# Patient Record
Sex: MALE | Race: BLACK OR AFRICAN AMERICAN | NOT HISPANIC OR LATINO | Employment: OTHER | URBAN - NONMETROPOLITAN AREA
[De-identification: names, ages, dates, MRNs, and addresses within clinical notes are randomized per-mention and may not be internally consistent; named-entity substitution may affect disease eponyms.]

---

## 2017-01-20 ENCOUNTER — HOSPITAL ENCOUNTER (INPATIENT)
Dept: OBSERVATION | Facility: HOSPITAL | Age: 57
LOS: 2 days | Discharge: HOME | End: 2017-01-22
Attending: INTERNAL MEDICINE | Admitting: INTERNAL MEDICINE

## 2017-01-20 LAB
ALBUMIN SERPL-MCNC: 3.8 GM/DL (ref 3.4–4.8)
ALP SERPL-CCNC: 55 U/L (ref 38–126)
ALT SERPL-CCNC: 35 U/L (ref 21–72)
ANION GAP SERPL CALCULATED.3IONS-SCNC: 13 MMOL/L (ref 5–15)
AST SERPL-CCNC: 37 U/L (ref 17–59)
BASOPHILS # BLD AUTO: 0.02 X1000/UL (ref 0–0.2)
BASOPHILS NFR BLD AUTO: 0.3 % (ref 0–2)
BILIRUB SERPL-MCNC: 1.3 MG/DL (ref 0.2–1.3)
BUN SERPL-MCNC: 14 MG/DL (ref 7–21)
CALCIUM SERPL-MCNC: 9.3 MG/DL (ref 8.4–10.2)
CHLORIDE SERPL-SCNC: 101 MMOL/L (ref 95–110)
CK MB SERPL-MCNC: 14.5 NG/ML (ref 0–5)
CK MB SERPL-MCNC: 8.7 NG/ML (ref 0–5)
CK MB SERPL-MCNC: 8.8 NG/ML (ref 0–5)
CK SERPL-CCNC: 196 U/L (ref 55–170)
CK SERPL-CCNC: 209 U/L (ref 55–170)
CK SERPL-CCNC: 256 U/L (ref 55–170)
CO2 SERPL-SCNC: 23 MMOL/L (ref 22–31)
CONV AUTO IG#: 0.01 X1000/UL (ref 0.01–0.02)
CONV AUTO IG%: 0.2 % (ref 0–0.5)
CREAT SERPL-MCNC: 1.3 MG/DL (ref 0.7–1.3)
EOSINOPHIL # BLD AUTO: 0.11 X1000/UL (ref 0–0.7)
EOSINOPHIL NFR BLD AUTO: 1.7 % (ref 0–7)
ERYTHROCYTE [DISTWIDTH] IN BLOOD: 12.9 % (ref 11.5–14.5)
GLUCOSE SERPL-MCNC: 188 MG/DL (ref 60–100)
GRANULOCYTES # BLD AUTO: 3.62 X1000/UL (ref 2–8.6)
GRANULOCYTES NFR BLD AUTO: 56.7 % (ref 37–80)
HBA1C MFR BLD CALC: 6.6 %TOTHGB (ref 4–5.6)
HCT VFR BLD CALC: 36.7 % (ref 39–49)
HGB BLD-MCNC: 13 GM/DL (ref 13.7–17.3)
LYMPHOCYTES # BLD AUTO: 2.13 X1000/UL (ref 0.6–4.2)
LYMPHOCYTES NFR BLD AUTO: 33.4 % (ref 10–50)
MCH RBC QN: 28.1 PG (ref 26–34)
MCHC RBC-ENTMCNC: 35.4 GM/DL (ref 31.5–36.3)
MCV RBC: 79.4 FL (ref 80–98)
MONOCYTES # BLD AUTO: 0.49 X1000/UL (ref 0–0.9)
MONOCYTES NFR BLD AUTO: 7.7 % (ref 0–12)
NRBC BLD AUTO-RTO: 0 % (ref 0–0.2)
NRBC SPEC MANUAL: 0 X1000/UL
PLATELET # BLD: 209 X1000/MM3 (ref 150–450)
PMV BLD: 9.4 FL (ref 8–12)
POTASSIUM SERPL-SCNC: 4.1 MMOL/L (ref 3.5–5.1)
PROT SERPL-MCNC: 6.9 GM/DL (ref 6.3–8.6)
RBC # BLD: 4.62 MEGA/MM3 (ref 4.37–5.74)
SODIUM SERPL-SCNC: 137 MMOL/L (ref 137–145)
TROPONIN I SERPL-MCNC: 0.15 NG/ML (ref 0–0.03)
TROPONIN I SERPL-MCNC: 0.16 NG/ML (ref 0–0.03)
TROPONIN I SERPL-MCNC: 0.16 NG/ML (ref 0–0.03)
WBC # BLD: 6.4 X1000/UL (ref 3.2–9.8)

## 2017-01-20 PROCEDURE — 82550 ASSAY OF CK (CPK): CPT

## 2017-01-20 PROCEDURE — 9990

## 2017-01-20 PROCEDURE — C1725 CATH, TRANSLUMIN NON-LASER: HCPCS

## 2017-01-20 PROCEDURE — C1769 GUIDE WIRE: HCPCS

## 2017-01-20 PROCEDURE — C1887 CATHETER, GUIDING: HCPCS

## 2017-01-20 PROCEDURE — C9600 PERC DRUG-EL COR STENT SING: HCPCS

## 2017-01-20 PROCEDURE — C1766 INTRO/SHEATH,STRBLE,NON-PEEL: HCPCS

## 2017-01-20 PROCEDURE — C1894 INTRO/SHEATH, NON-LASER: HCPCS

## 2017-01-20 PROCEDURE — 9990 CBC

## 2017-01-20 PROCEDURE — 36415 COLL VENOUS BLD VENIPUNCTURE: CPT

## 2017-01-20 PROCEDURE — 83036 HEMOGLOBIN GLYCOSYLATED A1C: CPT

## 2017-01-20 PROCEDURE — C1874 STENT, COATED/COV W/DEL SYS: HCPCS

## 2017-01-20 PROCEDURE — B205YZZ PLAIN RADIOGRAPHY OF LEFT HEART USING OTHER CONTRAST: ICD-10-PCS | Performed by: INTERNAL MEDICINE

## 2017-01-20 PROCEDURE — 80053 COMPREHEN METABOLIC PANEL: CPT

## 2017-01-20 PROCEDURE — 71010: CPT

## 2017-01-20 PROCEDURE — 93005 ELECTROCARDIOGRAM TRACING: CPT

## 2017-01-20 PROCEDURE — 93454 CORONARY ARTERY ANGIO S&I: CPT

## 2017-01-20 PROCEDURE — 85025 COMPLETE CBC W/AUTO DIFF WBC: CPT

## 2017-01-20 PROCEDURE — B201YZZ PLAIN RADIOGRAPHY OF MULTIPLE CORONARY ARTERIES USING OTHER CONTRAST: ICD-10-PCS | Performed by: INTERNAL MEDICINE

## 2017-01-20 PROCEDURE — 027034Z DILATION OF CORONARY ARTERY, ONE ARTERY WITH DRUG-ELUTING INTRALUMINAL DEVICE, PERCUTANEOUS APPROACH: ICD-10-PCS | Performed by: INTERNAL MEDICINE

## 2017-01-20 PROCEDURE — 82553 CREATINE MB FRACTION: CPT

## 2017-01-20 PROCEDURE — 84484 ASSAY OF TROPONIN QUANT: CPT

## 2017-01-20 PROCEDURE — 9990 COMPREHENSIVE METABOLIC PANEL

## 2017-01-20 PROCEDURE — 99285 EMERGENCY DEPT VISIT HI MDM: CPT

## 2017-01-20 PROCEDURE — 4A023N7 MEASUREMENT OF CARDIAC SAMPLING AND PRESSURE, LEFT HEART, PERCUTANEOUS APPROACH: ICD-10-PCS | Performed by: INTERNAL MEDICINE

## 2017-01-20 RX ORDER — ACETAMINOPHEN 325 MG/1
650 TABLET ORAL EVERY 4 HOURS PRN
Status: DISCONTINUED | OUTPATIENT
Start: 2017-01-21 | End: 2017-01-20 | Stop reason: SDUPTHER

## 2017-01-20 RX ORDER — HYDROCODONE BITARTRATE AND ACETAMINOPHEN 10; 325 MG/1; MG/1
1 TABLET ORAL 2 TIMES DAILY PRN
COMMUNITY

## 2017-01-20 RX ORDER — AMOXICILLIN 250 MG
1 CAPSULE ORAL DAILY PRN
Status: DISCONTINUED | OUTPATIENT
Start: 2017-01-21 | End: 2017-01-22 | Stop reason: HOSPADM

## 2017-01-20 RX ORDER — ASPIRIN 81 MG/1
81 TABLET, CHEWABLE ORAL DAILY
Status: DISCONTINUED | OUTPATIENT
Start: 2017-01-21 | End: 2017-01-22 | Stop reason: HOSPADM

## 2017-01-20 RX ORDER — HYDROCODONE BITARTRATE AND ACETAMINOPHEN 7.5; 325 MG/1; MG/1
1 TABLET ORAL EVERY 6 HOURS PRN
Status: DISCONTINUED | OUTPATIENT
Start: 2017-01-21 | End: 2017-01-22 | Stop reason: HOSPADM

## 2017-01-20 RX ORDER — NAPROXEN 500 MG/1
500 TABLET ORAL 2 TIMES DAILY WITH MEALS
COMMUNITY
End: 2017-01-22 | Stop reason: HOSPADM

## 2017-01-20 RX ORDER — TEMAZEPAM 15 MG/1
15 CAPSULE ORAL NIGHTLY PRN
Status: DISCONTINUED | OUTPATIENT
Start: 2017-01-21 | End: 2017-01-22 | Stop reason: HOSPADM

## 2017-01-20 RX ORDER — AMLODIPINE BESYLATE AND BENAZEPRIL HYDROCHLORIDE 2.5; 1 MG/1; MG/1
1 CAPSULE ORAL DAILY
COMMUNITY
End: 2017-01-22 | Stop reason: HOSPADM

## 2017-01-20 RX ORDER — HEPARIN SODIUM 10000 [USP'U]/100ML
250 INJECTION, SOLUTION INTRAVENOUS CONTINUOUS
Status: DISCONTINUED | OUTPATIENT
Start: 2017-01-21 | End: 2017-01-21 | Stop reason: ALTCHOICE

## 2017-01-20 RX ORDER — ONDANSETRON 2 MG/ML
4 INJECTION INTRAMUSCULAR; INTRAVENOUS EVERY 6 HOURS PRN
Status: DISCONTINUED | OUTPATIENT
Start: 2017-01-21 | End: 2017-01-22 | Stop reason: HOSPADM

## 2017-01-20 RX ORDER — ZOLPIDEM TARTRATE 10 MG/1
10 TABLET ORAL NIGHTLY PRN
COMMUNITY

## 2017-01-20 RX ORDER — ATORVASTATIN CALCIUM 40 MG/1
40 TABLET, FILM COATED ORAL NIGHTLY
Status: DISCONTINUED | OUTPATIENT
Start: 2017-01-21 | End: 2017-01-22 | Stop reason: HOSPADM

## 2017-01-20 NOTE — IP AVS SNAPSHOT
AFTER VISIT SUMMARY             Eitan Richards           About your hospitalization     You were admitted on:  January 20, 2017 You last received care in the:  [facility] UNIT       Procedures & Surgeries         Medications    If you or your caregiver advised us that you are currently taking a medication and that medication is marked below as “Resume”, this simply indicates that we have reviewed those medications to make sure our new therapy recommendations do not interfere.  If you have concerns about medications other than those new ones which we are prescribing today, please consult the physician who prescribed them (or your primary physician).  Our review of your home medications is not meant to indicate that we are directing their use.             Your Medications      START taking these medications     aspirin 81 MG chewable tablet   Chew 1 tablet Daily.   Last time this was given:  1/22/2017  8:01 AM   Next Dose Due:  1/23/2017 9:00 am             atorvastatin 40 MG tablet   Take 1 tablet by mouth Every Night.   Last time this was given:  1/21/2017  9:05 PM   Commonly known as:  LIPITOR   Next Dose Due:  1/23/2017 9:00 pm           lisinopril 2.5 MG tablet   Take 1 tablet by mouth Daily.   Commonly known as:  ZESTRIL   Next Dose Due:  1/23/2017 9:00 am           metoprolol tartrate 25 MG tablet   Take 1 tablet by mouth Every 12 (Twelve) Hours.   Last time this was given:  1/22/2017  8:01 AM   Commonly known as:  LOPRESSOR   Next Dose Due:  1/23/2017 9:00 am           ticagrelor 90 MG tablet tablet   Take 1 tablet by mouth 2 (Two) Times a Day.   Last time this was given:  1/22/2017  5:53 PM   Commonly known as:  BRILINTA   Next Dose Due:  1/22/2017 9:00 pm             CHANGE how you take these medications     metFORMIN 500 MG tablet   Take 1 tablet by mouth 2 (Two) Times a Day With Meals.   Commonly known as:  GLUCOPHAGE   What changed:    - how much to take  - when to take this      Next Dose Due:  1/23/2017 5:00 pm   Notes to Patient:  Do not take until tomorrow afternoon             CONTINUE taking these medications     AMBIEN 10 MG tablet   Take 10 mg by mouth At Night As Needed for sleep.   Last time this was given:  1/21/2017  1:55 AM   Generic drug:  zolpidem   Next Dose Due:  1/23/2017 9:00 pm           HYDROcodone-acetaminophen  MG per tablet   Take 1 tablet by mouth 2 (Two) Times a Day As Needed for moderate pain (4-6).   Commonly known as:  NORCO   Next Dose Due:  Every 12 hours as needed for pain           sertraline 50 MG tablet   Take 50 mg by mouth Daily.   Last time this was given:  1/22/2017  8:01 AM   Commonly known as:  ZOLOFT   Next Dose Due:  1/23/17 9:00 am             STOP taking these medications     amLODIPine-benazepril 2.5-10 MG per capsule   Commonly known as:  LOTREL 2.5-10           naproxen 500 MG tablet   Commonly known as:  NAPROSYN                Where to Get Your Medications      You can get these medications from any pharmacy     Bring a paper prescription for each of these medications     aspirin 81 MG chewable tablet    atorvastatin 40 MG tablet    lisinopril 2.5 MG tablet    metFORMIN 500 MG tablet    metoprolol tartrate 25 MG tablet    ticagrelor 90 MG tablet tablet                  Your Medications      Your Medication List           Morning Noon Evening Bedtime As Needed    AMBIEN 10 MG tablet   Take 10 mg by mouth At Night As Needed for sleep.   Generic drug:  zolpidem                                   aspirin 81 MG chewable tablet   Chew 1 tablet Daily.                                   atorvastatin 40 MG tablet   Take 1 tablet by mouth Every Night.   Commonly known as:  LIPITOR                                   HYDROcodone-acetaminophen  MG per tablet   Take 1 tablet by mouth 2 (Two) Times a Day As Needed for moderate pain (4-6).   Commonly known as:  NORCO                                   lisinopril 2.5 MG tablet   Take 1 tablet by  mouth Daily.   Commonly known as:  ZESTRIL                                   metFORMIN 500 MG tablet   Take 1 tablet by mouth 2 (Two) Times a Day With Meals.   Commonly known as:  GLUCOPHAGE   Notes to Patient:  Do not take until tomorrow afternoon                                      metoprolol tartrate 25 MG tablet   Take 1 tablet by mouth Every 12 (Twelve) Hours.   Commonly known as:  LOPRESSOR                                      sertraline 50 MG tablet   Take 50 mg by mouth Daily.   Commonly known as:  ZOLOFT                                   ticagrelor 90 MG tablet tablet   Take 1 tablet by mouth 2 (Two) Times a Day.   Commonly known as:  BRILINTA                                            Instructions for After Discharge        Activity Instructions     As tolerated. Please refer to Post Cardiac Catheterization            Diet Instructions     Cardiac Diabetic Diet- information given             Discharge Instructions       The Diet- Heart healthy,  diabetic diet.  Activity- as tolerated  Medications- see the medication reconciliation completed the time of discharge.  Patient was asked to call or return if the temperatures greater than 100.4, if there is worsening nausea, vomiting, chest pain, shortness of breath, abdominal pain or any other concerns.    Discharge References/Attachments     ACUTE CORONARY SYNDROME (ENGLISH)    CORONARY ARTERY DISEASE, MALE (ENGLISH)    HEART ATTACK, NON-ST SEGMENT ELEVATION (ENGLISH)    TRIGLYCERIDES (TG, TRIG) (ENGLISH)    TYPE 2 DIABETES MELLITUS, ADULT (ENGLISH)       Follow-ups for After Discharge        Follow-up Information     Follow up with Ajith Lutz MD. Schedule an appointment as soon as possible for a visit in 3 week(s).    Specialty:  Cardiology    Why:  For NSTEMI    Contact information:    [address], [address]  [facility] [address] [phone]          Follow up with Clementine Zelaya MD. Schedule an appointment as soon as possible for a visit  in 1 week(s).    Specialty:  Nephrology    Why:  For CKD    Contact information:    [address] [facility] [address] [phone]          Follow up with Dr. Baeza. Schedule an appointment as soon as possible for a visit in 1 week(s).    Why:  Please call MD for follow up appointment    Contact information:    [facility] [place]   [phone]      [employer] Signup     Our records indicate that you have an active [employer] account.    You can view your After Visit Summary by going to [employer] and logging in with your [employer] username and password.  If you don't have a [employer] username and password but a parent or guardian has access to your record, the parent or guardian should login with their own [employer] username and password and access your record to view the After Visit Summary.    If you have questions, you can email [email or web address] or call [phone] to talk to our [employer] staff.  Remember, [employer] is NOT to be used for urgent needs.  For medical emergencies, dial 911.           Summary of Your Hospitalization        Reason for Hospitalization     Your primary diagnosis was:  Not on File    Your diagnoses also included:  Non Q Wave Myocardial Infarction, High Blood Pressure, Diabetes      Care Providers     Provider Service Role Specialty    John Omalley MD Medicine Attending Provider Hospitalist    Stephen Corey MD Medicine Consulting Physician  Hospitalist     Ajith Lutz MD Cardiology Consulting Physician  Cardiology       Your Allergies  Date Reviewed: 1/20/2017    No active allergies      Pending Labs     Order Current Status    POC Glucose Fingerstick In process      Patient Belongings Returned     Document Return of Belongings Flowsheet     Were the patient bedside belongings sent home?   Yes   Belongings Retrieved from Security & Sent Home   N/A    Belongings Sent to Safe   --   Medications Retrieved from Pharmacy  & Sent Home   N/A              More Information      Acute Coronary Syndrome  Acute coronary syndrome (ACS) is a serious problem in which there is suddenly not enough blood and oxygen supplied to the heart. ACS may mean that one or more of the blood vessels in your heart (coronary arteries) may be blocked. ACS can result in chest pain or a heart attack (myocardial infarction or MI).  CAUSES  This condition is caused by atherosclerosis, which is the buildup of fat and cholesterol (plaque) on the inside of the arteries. Over time, the plaque may narrow or block the artery, and this will lessen blood flow to the heart. Plaque can also become weak and break off within a coronary artery to form a clot and cause a sudden blockage.  RISK FACTORS  The risks factors of this condition include:  · High cholesterol levels.  · High blood pressure (hypertension).  · Smoking.  · Diabetes.  · Age.  · Family history of chest pain, heart disease, or stroke.  · Lack of exercise.  SYMPTOMS  The most common signs of this condition include:  · Chest pain, which can be:    A crushing or squeezing in the chest.    A tightness, pressure, fullness, or heaviness in the chest.    Present for more than a few minutes, or it can stop and recur.  · Pain in the arms, neck, jaw, or back.  · Unexplained heartburn or indigestion.  · Shortness of breath.  · Nausea.  · Sudden cold sweats.  · Feeling light-headed or dizzy.  Sometimes, this condition has no symptoms.  DIAGNOSIS  ACS may be diagnosed through the following tests:  · Electrocardiogram (ECG).  · Blood tests.  · Coronary angiogram. This is a procedure to look at the coronary arteries to see if there is any blockage.  TREATMENT  Treatment for ACS may include:  · Healthy behavioral changes to reduce or control risk factors.  · Medicine.  · Coronary stenting. A stent helps to keep an artery open.  · Coronary angioplasty. This procedure widens a narrowed or blocked artery.  · Coronary artery  bypass surgery. This will allow your blood to pass the blockage (bypass) to reach your heart.  HOME CARE INSTRUCTIONS  Eating and Drinking  · Follow a heart-healthy diet. A dietitian can you help to educate you about healthy food options and changes.  · Use healthy cooking methods such as roasting, grilling, broiling, baking, poaching, steaming, or stir-frying. Talk to a dietitian to learn more about healthy cooking methods.  Medicines  · Take medicines only as directed by your health care provider.  · Do not take the following medicines unless your health care provider approves:    Nonsteroidal anti-inflammatory drugs (NSAIDs), such as ibuprofen, naproxen, or celecoxib.    Vitamin supplements that contain vitamin A, vitamin E, or both.    Hormone replacement therapy that contains estrogen with or without progestin.  · Stop illegal drug use.  Activities  · Follow an exercise program that is approved by your health care provider.  · Plan rest periods when you are fatigued.  Lifestyle  · Do not use any tobacco products, including cigarettes, chewing tobacco, or electronic cigarettes. If you need help quitting, ask your health care provider.  · If you drink alcohol, and your health care provider approves, limit your alcohol intake to no more than 1 drink per day. One drink equals 12 ounces of beer, 5 ounces of wine, or 1½ ounces of hard liquor.  · Learn to manage stress.  · Maintain a healthy weight. Lose weight as approved by your health care provider.  General Instructions  · Manage other health conditions, such as hypertension and diabetes, as directed by your health care provider.  · Keep all follow-up visits as directed by your health care provider. This is important.  · Your health care provider may ask you to monitor your blood pressure. A blood pressure reading consists of a higher number over a lower number, such as 110 over 72, written as 110/72. Ideally, your blood pressure should be:    Below 140/90 if you  have no other medical conditions.    Below 130/80 if you have diabetes or kidney disease.  SEEK IMMEDIATE MEDICAL CARE IF:  · You have pain in your chest, neck, arm, jaw, stomach, or back that lasts more than a few minutes, is recurring, or is not relieved by taking medicine under your tongue (sublingual nitroglycerin).  · You have profuse sweating without cause.  · You have unexplained:    Heartburn or indigestion.    Shortness of breath or difficulty breathing.    Nausea or vomiting.    Fatigue.    Feelings of nervousness or anxiety.    Weakness.    Diarrhea.  · You have sudden light-headedness or dizziness.  · You faint.  These symptoms may represent a serious problem that is an emergency. Do not wait to see if the symptoms will go away. Get medical help right away. Call your local emergency services (911 in the [place] Do not drive yourself to the clinic or hospital.     This information is not intended to replace advice given to you by your health care provider. Make sure you discuss any questions you have with your health care provider.     Document Released: 12/18/2006 Document Revised: 01/08/2016 Document Reviewed: 04/21/2015  [employer] Interactive Patient Education ©2016 [employer]          Coronary Artery Disease, Male  Coronary artery disease (CAD) is a process in which the blood vessels of the heart (coronary arteries) become narrow or blocked. The narrowing or blockage can lead to decreased blood flow to the heart muscle (angina). Prolonged reduced blood flow can cause a heart attack (myocardial infarction, MI). Because CAD is the leading cause of death in men, it is important to understand what causes this condition and how it is treated.  CAUSES  Atherosclerosis is the cause of CAD. This is the buildup of fat and cholesterol (plaque) on the inside of the arteries. Over time, the plaque may narrow or block the artery, and this will lessen blood flow to the heart. Plaque can also become weak and break  off within a coronary artery to form a clot and cause a sudden blockage.  RISK FACTORS  Many risk factors increase your chances of getting CAD, including:  · High cholesterol levels.  · High blood pressure (hypertension).  · Tobacco use.  · Diabetes.  · Age. Men over age 45 are at a greater risk of CAD.  · Gender. Men often develop CAD earlier in life than women.  · Family history of CAD.  · Obesity.  · Lack of exercise.  · A diet high in saturated fats.  SYMPTOMS   Many people do not experience any symptoms during the early stages of CAD. As the condition progresses, symptoms may include:   · Chest pain.  ¨ The pain can be described as a crushing or squeezing in the chest, or a tightness, pressure, fullness, or heaviness in the chest.  ¨ The pain can last more than a few minutes or can stop and recur.   · Pain in the arms, neck, jaw, or back.  · Unexplained heartburn or indigestion.  · Shortness of breath.  · Nausea.  · Sudden cold sweats.   Less common symptoms of CAD in men can include:  · Fatigue.  · Unexplained feelings of nervousness or anxiety.  · Weakness.  · Diarrhea.  · Sudden light-headedness.  DIAGNOSIS   Tests to diagnose CAD may include:  · ECG (electrocardiogram).  · Exercise stress test. This looks for signs of blockage when the heart is being exercised.  · Pharmacologic stress test. This test looks for signs of blockage when the heart is being stressed with a medicine.  · Blood tests.  · Coronary angiogram. This is a procedure to look at the coronary arteries to see if there is any blockage.  TREATMENT  The treatment of CAD may include the following:  · Healthy behavioral changes to reduce or control risk factors.  · Medicine.  · Coronary stenting. A stent helps to keep an artery open.  · Coronary angioplasty. This procedure widens a narrowed or blocked artery.  · Coronary artery bypass surgery. This will allow your blood to pass the blockage (bypass) to reach your heart.  HOME CARE  INSTRUCTIONS  · Take medicines only as directed by your health care provider.  · Do not take the following medicines unless your health care provider approves:    Nonsteroidal anti-inflammatory drugs (NSAIDs), such as ibuprofen, naproxen, or celecoxib.    Vitamin supplements that contain vitamin A, vitamin E, or both.  · Manage other health conditions such as hypertension and diabetes as directed by your health care provider.  · Follow a heart-healthy diet. A dietitian can help to educate you about healthy food options and changes.  · Use healthy cooking methods such as roasting, grilling, broiling, baking, poaching, steaming, or stir-frying. Talk to a dietitian to learn more about healthy cooking methods.  · Follow an exercise program approved by your health care provider.  · Maintain a healthy weight. Lose weight as approved by your health care provider.  · Plan rest periods when fatigued.  · Learn to manage stress.  · Do not use any tobacco products, including cigarettes, chewing tobacco, or electronic cigarettes. If you need help quitting, ask your health care provider.  · If you drink alcohol, and your health care provider approves, limit your alcohol intake to no more than 1 drink per day. One drink equals 12 ounces of beer, 5 ounces of wine, or 1½ ounces of hard liquor.  · Stop illegal drug use.  · Your health care provider may ask you to monitor your blood pressure. A blood pressure reading consists of a higher number over a lower number, such as 110 over 72, which is written as 110/72. Ideally, your blood pressure should be:    Below 140/90 if you have no other medical conditions.    Below 130/80 if you have diabetes or kidney disease.  · Keep all follow-up visits as directed by your health care provider. This is important.  SEEK IMMEDIATE MEDICAL CARE IF:  · You have pain in your chest, neck, arm, jaw, stomach, or back that lasts more than a few minutes, is recurring, or is unrelieved by taking medicine  under your tongue (sublingual nitroglycerin).  · You have profuse sweating without cause.  · You have unexplained:    Heartburn or indigestion.    Shortness of breath or difficulty breathing.    Nausea or vomiting.    Fatigue.    Feelings of nervousness or anxiety.    Weakness.    Diarrhea.  · You have sudden light-headedness or dizziness.  · You faint.  These symptoms may represent a serious problem that is an emergency. Do not wait to see if the symptoms will go away. Get medical help right away. Call your local emergency services (911 in the [place] Do not drive yourself to the hospital.     This information is not intended to replace advice given to you by your health care provider. Make sure you discuss any questions you have with your health care provider.     Document Released: 07/15/2015 Document Reviewed: 07/15/2015  [employer] Interactive Patient Education ©2016 [employer]          Non-ST Segment Elevation Heart Attack  A heart attack (myocardial infarction) happens when some of the heart muscle is injured or dies because it does not get enough oxygen. A non-ST segment elevation heart attack is a type of heart attack. It happens when the body does not get enough oxygen because an artery carrying blood to the heart muscles (coronary artery) becomes partly or temporarily blocked. This type of heart attack is usually less severe than the type of heart attack in which a coronary artery becomes completely blocked.  CAUSES  The most common cause of this condition is a blocked coronary artery. A coronary artery can become blocked from a gradual buildup of cholesterol, fat, and plaque. A blood clot can form over the plaque and block blood flow.  RISK FACTORS  This condition is more likely to develop in:  · Smokers.  · Males.  · Older adults.  · Overweight and obese adults.  · People with high blood pressure (hypertension), high cholesterol, or diabetes.  · People with a family history of heart disease.  · People  who do not get enough exercise.  · People who are under a lot of stress.  · People who drink too much alcohol.  · People who use illegal street drugs that increase the heart rate, such as cocaine and methamphetamines.  SYMPTOMS  Symptoms of this condition include:  · Chest pain or a feeling of pressure in the chest. It may feel like something is crushing or squeezing the chest.  · Discomfort in the upper back or in the area between the shoulder blades.  · Upper back pain.  · Tingling in the hands and arms.  · Shortness of breath.  · Heartburn or indigestion.  · Sudden cold sweats.  · Unexplained sweating.  · Sudden lightheadedness.  · Unexplained feelings of nervousness or anxiety.  · Feeling of tiredness, or not feeling well.  DIAGNOSIS  This condition is diagnosed based on a person's signs and symptoms and a physical exam. You may also have tests done, including:  · Blood tests.  · A chest X-ray.  · An test to measure the electrical activity of the heart (electrocardiogram).  · A test that uses sound waves to produce a picture of the heart (echocardiogram).  · A test to look at the heart arteries (coronary angiogram).  If you are still having chest pain after 12-24 hours, or if your health care providers think your heart is at risk, you may have a procedure called cardiac catheterization. In this procedure, a long, thin tube is inserted into an artery in your groin and moved up to the arteries in your heart. This procedure helps your health care provider figure out the source of the problem.   TREATMENT  This condition may be treated with:  · Bed rest in the hospital.  · Medicines to relieve chest pain.  · Medicines to protect the heart.  · If you have a blockage, a procedure in which the artery is opened (angioplasty) and a stent is placed to keep the artery open.  After initial treatment you may need to take medicine to:  · Keep your blood from clotting too easily.  · Control your blood pressure.  · Lower your  cholesterol.  · Control abnormal heart rhythms (arrhythmias).  HOME CARE INSTRUCTIONS  · Take medicines only as directed by your health care provider.  · Do not take the following medicines unless your health care provider approves:    Nonsteroidal anti-inflammatory drugs (NSAIDs), such as ibuprofen, naproxen, or celecoxib.    Vitamin supplements that contain vitamin A, vitamin E, or both.    Hormone replacement therapy that contains estrogen with or without progestin.  · Make lifestyle changes as directed by your health care provider. These may include:    Using no tobacco products, including cigarettes, chewing tobacco, and electronic cigarettes. If you are struggling to quit, ask your health care provider for help.    Exercising as directed by your health care provider. Ask for a list of activities that are safe for you.    Eating a heart-healthy diet. Work with a registered dietitian to learn healthy eating options.    Maintaining a healthy weight.    Managing other medical conditions, like diabetes.    Reducing stress.    Limiting how much alcohol you drink as directed by your health care provider.  SEEK IMMEDIATE MEDICAL CARE IF:  · You have any symptoms of this condition.     This information is not intended to replace advice given to you by your health care provider. Make sure you discuss any questions you have with your health care provider.     Document Released: 07/17/2006 Document Revised: 03/11/2013 Document Reviewed: 11/25/2015  [employer] Interactive Patient Education ©2016 [employer]          Triglycerides Test  Triglycerides are a type of fat in your body. Your body naturally produces some triglycerides. More can accumulate when you eat more food than your body needs.  Most triglycerides are stored in fatty tissues. They are released when the body needs energy. Triglycerides also circulate in your blood as part of a cholesterol particle known as very-low-density lipoprotein (VLDL). Cholesterol and  triglycerides are often tested at the same time. That test is called a lipid profile.  You may have this test as part of a routine physical exam. Health care providers recommend that adults have this test at least once every 5 years. High triglycerides can increase your risk for heart disease. You may need to have your triglycerides checked more often if you have other risk factors for heart disease.  This test requires a sample of blood taken from a vein in your hand or arm. Some lipid profiles can be done on a portable testing device using only a drop of blood taken from your fingertip (finger stick).  PREPARATION FOR TEST  · Do not eat anything for 9-12 hours before the test as directed by your health care provider.  · Do not consume alcohol for at least 24 hours before the test.  · Ask your health care provider if you should stop taking your regular medicines. Many medicines and supplements can interfere with the results of the test.  · Follow any other instructions given by your health care provider.  RESULTS  It is your responsibility to obtain your test results. Ask the lab or department performing the test when and how you will get your results. Contact your health care provider to discuss any questions you have about your results.   Triglycerides are usually measured in milligrams per deciliter (mg/dL).  Range of Normal Values  Ranges for normal values may vary among different labs and hospitals. You should always check with your health care provider after having lab work or other tests done to discuss whether your values are considered within normal limits.  The normal ranges for triglycerides are as follows:  · Adults:    Male:  mg/dL or 0.45-1.81 mmol/L (SI units).    Female:  mg/dL or 0.40-1.52 mmol/L (SI units).  · Children age 5 years or younger:    Male: 30-86 mg/dL.    Female: 32-99 mg/dL.  · Children age 6-11 years:    Male:  mg/dL.    Female:  mg/dL.  · Children age 12-15  years:    Male:  mg/dL.    Female:  mg/dL.  · Children age 16-19 years:    Male:  mg/dL.    Female:  mg/dL.  Meaning of Results Outside Normal Value Ranges  If your triglyceride level is higher than the normal range, you have an increased risk for heart disease. Triglycerides can also be elevated in certain diseases, such as diabetes.  If you have high triglycerides, your health care provider will likely recommend measures to reduce your triglycerides and your risk of heart disease. This can be done through:  · Diet.  · Exercise.  · Medicines.  · Lifestyle measures such as quitting smoking and limiting how much alcohol you drink.     This information is not intended to replace advice given to you by your health care provider. Make sure you discuss any questions you have with your health care provider.     Document Released: 01/20/2006 Document Revised: 01/08/2016 Document Reviewed: 04/09/2015  [employer] Interactive Patient Education ©2016 [employer]          Type 2 Diabetes Mellitus, Adult  Type 2 diabetes mellitus, often simply referred to as type 2 diabetes, is a long-lasting (chronic) disease. In type 2 diabetes, the pancreas does not make enough insulin (a hormone), the cells are less responsive to the insulin that is made (insulin resistance), or both. Normally, insulin moves sugars from food into the tissue cells. The tissue cells use the sugars for energy. The lack of insulin or the lack of normal response to insulin causes excess sugars to build up in the blood instead of going into the tissue cells. As a result, high blood sugar (hyperglycemia) develops. The effect of high sugar (glucose) levels can cause many complications.   Type 2 diabetes was also previously called adult-onset diabetes, but it can occur at any age.     RISK FACTORS   A person is predisposed to developing type 2 diabetes if someone in the family has the disease and also has one or more of the following primary  risk factors:  · Weight gain, or being overweight or obese.  · An inactive lifestyle.  · A history of consistently eating high-calorie foods.  Maintaining a normal weight and regular physical activity can reduce the chance of developing type 2 diabetes.  SYMPTOMS   A person with type 2 diabetes may not show symptoms initially. The symptoms of type 2 diabetes appear slowly. The symptoms include:  · Increased thirst (polydipsia).  · Increased urination (polyuria).  · Increased urination during the night (nocturia).  · Sudden or unexplained weight changes.  · Frequent, recurring infections.  · Tiredness (fatigue).  · Weakness.  · Vision changes, such as blurred vision.  · Fruity smell to your breath.  · Abdominal pain.  · Nausea or vomiting.  · Cuts or bruises which are slow to heal.  · Tingling or numbness in the hands or feet.  · An open skin wound (ulcer).  DIAGNOSIS  Type 2 diabetes is frequently not diagnosed until complications of diabetes are present. Type 2 diabetes is diagnosed when symptoms or complications are present and when blood glucose levels are increased. Your blood glucose level may be checked by one or more of the following blood tests:  · A fasting blood glucose test. You will not be allowed to eat for at least 8 hours before a blood sample is taken.  · A random blood glucose test. Your blood glucose is checked at any time of the day regardless of when you ate.  · A hemoglobin A1c blood glucose test. A hemoglobin A1c test provides information about blood glucose control over the previous 3 months.  · An oral glucose tolerance test (OGTT). Your blood glucose is measured after you have not eaten (fasted) for 2 hours and then after you drink a glucose-containing beverage.  TREATMENT   · You may need to take insulin or diabetes medicine daily to keep blood glucose levels in the desired range.  · If you use insulin, you may need to adjust the dosage depending on the carbohydrates that you eat with each  meal or snack.  · Lifestyle changes are recommended as part of your treatment. These may include:    Following an individualized diet plan developed by a nutritionist or dietitian.    Exercising daily.  Your health care providers will set individualized treatment goals for you based on your age, your medicines, how long you have had diabetes, and any other medical conditions you have. Generally, the goal of treatment is to maintain the following blood glucose levels:  · Before meals (preprandial):  mg/dL.  · After meals (postprandial): below 180 mg/dL.  · A1c: less than 6.5-7%.  HOME CARE INSTRUCTIONS   · Have your hemoglobin A1c level checked twice a year.  · Perform daily blood glucose monitoring as directed by your health care provider.  · Monitor urine ketones when you are ill and as directed by your health care provider.  · Take your diabetes medicine or insulin as directed by your health care provider to maintain your blood glucose levels in the desired range.    Never run out of diabetes medicine or insulin. It is needed every day.    If you are using insulin, you may need to adjust the amount of insulin given based on your intake of carbohydrates. Carbohydrates can raise blood glucose levels but need to be included in your diet. Carbohydrates provide vitamins, minerals, and fiber which are an essential part of a healthy diet. Carbohydrates are found in fruits, vegetables, whole grains, dairy products, legumes, and foods containing added sugars.  · Eat healthy foods. You should make an appointment to see a registered dietitian to help you create an eating plan that is right for you.  · Lose weight if you are overweight.  · Carry a medical alert card or wear your medical alert jewelry.  · Carry a 15-gram carbohydrate snack with you at all times to treat low blood glucose (hypoglycemia). Some examples of 15-gram carbohydrate snacks include:    Glucose tablets, 3 or 4.    Glucose gel, 15-gram tube.     Raisins, 2 tablespoons (24 grams).    Jelly beans, 6.    Animal crackers, 8.    Regular pop, 4 ounces (120 mL).    Gummy treats, 9.  · Recognize hypoglycemia. Hypoglycemia occurs with blood glucose levels of 70 mg/dL and below. The risk for hypoglycemia increases when fasting or skipping meals, during or after intense exercise, and during sleep. Hypoglycemia symptoms can include:    Tremors or shakes.    Decreased ability to concentrate.    Sweating.    Increased heart rate.    Headache.    Dry mouth.    Hunger.    Irritability.    Anxiety.    Restless sleep.    Altered speech or coordination.    Confusion.  · Treat hypoglycemia promptly. If you are alert and able to safely swallow, follow the 15:15 rule:    Take 15-20 grams of rapid-acting glucose or carbohydrate. Rapid-acting options include glucose gel, glucose tablets, or 4 ounces (120 mL) of fruit juice, regular soda, or low-fat milk.    Check your blood glucose level 15 minutes after taking the glucose.    Take 15-20 grams more of glucose if the repeat blood glucose level is still 70 mg/dL or below.    Eat a meal or snack within 1 hour once blood glucose levels return to normal.  · Be alert to feeling very thirsty and urinating more frequently than usual, which are early signs of hyperglycemia. An early awareness of hyperglycemia allows for prompt treatment. Treat hyperglycemia as directed by your health care provider.  · Engage in at least 150 minutes of moderate-intensity physical activity a week, spread over at least 3 days of the week or as directed by your health care provider. In addition, you should engage in resistance exercise at least 2 times a week or as directed by your health care provider. Try to spend no more than 90 minutes at one time inactive.  · Adjust your medicine and food intake as needed if you start a new exercise or sport.  · Follow your sick-day plan anytime you are unable to eat or drink as usual.  · Do not use any tobacco products  including cigarettes, chewing tobacco, or electronic cigarettes. If you need help quitting, ask your health care provider.  · Limit alcohol intake to no more than 1 drink per day for nonpregnant women and 2 drinks per day for men. You should drink alcohol only when you are also eating food. Talk with your health care provider whether alcohol is safe for you. Tell your health care provider if you drink alcohol several times a week.  · Keep all follow-up visits as directed by your health care provider. This is important.  · Schedule an eye exam soon after the diagnosis of type 2 diabetes and then annually.  · Perform daily skin and foot care. Examine your skin and feet daily for cuts, bruises, redness, nail problems, bleeding, blisters, or sores. A foot exam by a health care provider should be done annually.  · Brush your teeth and gums at least twice a day and floss at least once a day. Follow up with your dentist regularly.  · Share your diabetes management plan with your workplace or school.  · Keep your immunizations up to date. It is recommended that you receive a flu (influenza) vaccine every year. It is also recommended that you receive a pneumonia (pneumococcal) vaccine. If you are 65 years of age or older and have never received a pneumonia vaccine, this vaccine may be given as a series of two separate shots. Ask your health care provider which additional vaccines may be recommended.  · Learn to manage stress.  · Obtain ongoing diabetes education and support as needed.  · Participate in or seek rehabilitation as needed to maintain or improve independence and quality of life. Request a physical or occupational therapy referral if you are having foot or hand numbness, or difficulties with grooming, dressing, eating, or physical activity.  SEEK MEDICAL CARE IF:   · You are unable to eat food or drink fluids for more than 6 hours.  · You have nausea and vomiting for more than 6 hours.  · Your blood glucose level  is over 240 mg/dL.  · There is a change in mental status.  · You develop an additional serious illness.  · You have diarrhea for more than 6 hours.  · You have been sick or have had a fever for a couple of days and are not getting better.  · You have pain during any physical activity.    SEEK IMMEDIATE MEDICAL CARE IF:  · You have difficulty breathing.  · You have moderate to large ketone levels.     This information is not intended to replace advice given to you by your health care provider. Make sure you discuss any questions you have with your health care provider.     Document Released: 12/18/2006 Document Revised: 09/07/2016 Document Reviewed: 07/16/2013  [employer] Interactive Patient Education ©2016 [employer]            SYMPTOMS OF A STROKE    Call 911 or have someone take you to the Emergency Department if you have any of the following:    · Sudden numbness or weakness of your face, arm or leg especially on one side of the body  · Sudden confusion, diffiiculty speaking or trouble understanding   · Changes in your vision or loss of sight in one eye  · Sudden severe headache with no known cause  · sudden dizziness, trouble walking, loss of balance or coordination    It is important to seek emergency care right away if you have further stroke symptoms. If you get emergency help quickly, the powerful clot-dissolving medicines can reduce the disabilities caused by a stroke.     For more information:    American Stroke Association  9-208-7-STROKE  www.strokeassociation.org           IF YOU SMOKE OR USE TOBACCO PLEASE READ THE FOLLOWING:    Why is smoking bad for me?  Smoking increases the risk of heart disease, lung disease, vascular disease, stroke, and cancer.     If you smoke, STOP!    If you would like more information on quitting smoking, please visit the [employer] website: [email or web address]   This link will provide additional resources including the QUIT  line and the [employer] support groups.     For more information:    American Cancer Society  ([phone]    American Heart Association  1-909.748.4942               YOU ARE THE MOST IMPORTANT FACTOR IN YOUR RECOVERY.     Follow all instructions carefully.     I have reviewed my discharge instructions with my nurse, including the following information, if applicable:     Information about my illness and diagnosis   Follow up appointments (including lab draws)   Wound Care   Equipment Needs   Medications (new and continuing) along with side effects   Preventative information such as vaccines and smoking cessations   Diet   Pain   I know when to contact my Doctor's office or seek emergency care      I want my nurse to describe the side effects of my medications: YES NO   If the answer is no, I understand the side effects of my medications: YES NO   My nurse described the side effects of my medications in a way that I could understand: YES NO   I have taken my personal belongings and my own medications with me at discharge: YES NO            I have received this information and my questions have been answered. I have discussed any concerns I see with this plan with the nurse or physician. I understand these instructions.    Signature of Patient or Responsible Person: _____________________________________    Date: _________________  Time: __________________    Signature of Healthcare Provider: _______________________________________  Date: _________________  Time: __________________

## 2017-01-20 NOTE — ED PROVIDER NOTES
Clinical Report - Physicians  Patient: JAX WANG    MRN: [id number] [facility]  VisitID: [id number] [address] [place] [address], [place] [id number] [phone]  56y, M Registration Date/Time: 01/20/2017 12:08      Time Seen: 12:22.    Arrived- By private vehicle.  Historian- patient and family.      HISTORY OF PRESENT ILLNESS  Chief Complaint: CHEST PAIN.  At its maximum, severity described as moderate.   When seen in the E.D., severity described as moderate.  It is described as   pressure and tightness and it is described as located in the central chest   area.  This started yesterday and is still present.  (Patient notes a sharp,   then burning chest pain in the central chest radiating out with a burning.    Patient also with nausea and subjective diaphoresis with these symptoms.).      Similar symptoms previously: None.      Recent medical care: Not recently seen/assessed.      REVIEW OF SYSTEMS  No fever, chills, cough, pedal edema or calf pain.  No fainting episodes,   headache, sore throat, abdominal pain or black stools.  No skin rash,   enlarged lymph nodes, joint pain or bloody stools.  All systems otherwise   negative, except as recorded above.      PAST HISTORY  Appendectomy.  Rotator Cuff Surgery.      SOCIAL HISTORY  Former smoker.      ADDITIONAL NOTES  The nursing notes have been reviewed.      PHYSICAL EXAM  Vital Signs: 01/20/2017 14:45 BP: 129/83. HR: 64. RR: 18. O2 saturation: 99%.   Pain level now: 5/10.  01/20/2017 12:10 BP: 104/71. HR: 62. RR: 22. O2 saturation: 97%. Temp: 97.5   F.  Have been reviewed and appear to be correct.    Appearance: Alert.  Oriented X3.  No acute distress.    Eyes: Eyes normal inspection.    Neck: Normal inspection.  Neck supple.    CVS: Normal heart rate and rhythm.  Heart sounds normal.  Pulses normal.    Respiratory: No respiratory distress.  Breath sounds normal.  Chest   nontender.    Abdomen: Soft and nontender.  Bowel sounds normal.  No rebound  tenderness,   distention, mass present or guarding.    Back: Normal external inspection.  No CVA tenderness.    Skin: Skin warm and dry.  Normal skin color.  No rash.  Normal skin turgor.    Extremities: Extremities exhibit normal ROM.  No lower extremity edema.    Neuro: Oriented X 3.  No motor deficit.  No sensory deficit.      LABS, X-RAYS, AND EKG  EKG: Normal sinus rhythm.  Normal P waves.  Normal QRS complex.  Non-specific   ST segment / T wave abnormalities.  No ST elevation.  The study has been   interpreted contemporaneously by me.  The study has been independently viewed   by me.    Laboratory Tests:    Protein, Misc Fluid:    (SHILPI: 01/20/2017 12:48)  ( MsgRcvd 01/20/2017 12:48)   Final results     **Test**                                **Result**      **Flag**      **Units**      **(Reference)**   Read By                                                                           Report    Patient Name-  JAX WANG                                    Patient ID-  [id number]                                     Ordering-  FAUSTINO MASCORRO MD                                    Attending-  DR BASILIO                                   --                                      Referring-                                 ------------------------------------------------                                  Chest single view                                    CLINICAL INDICATION- Shortness of breath. Chest pain                             --                                                                                 COMPARISON- Chest November 18, 2015.                                   --                                                                                 FINDINGS- Cardiac silhouette is normal in size. Pulmonary vascularity             is unremarkable.                                    --                                                                                 No focal infiltrate or consolidation.  No  pleural effusion.  No                  --                                      pneumothorax.                             --                                                                                 CONCLUSION- No evidence of active disease.                                   --                                                                                 Electronically Signed By- Will Rodrigues MD On- 2017-01-20 12-46-24                --                                                                                      Reading RadiologistBlack RODRIGUES                                         Releasing RadiologistBlack RODRIGUES                                         Released Date Time- 01/20/17 1248                                       ------------------------------------------------------------------------------                                                                       Released By                             GISELLE                                    VENIPUNCTURE:    (SHILPI: 01/20/2017 12:20)  ( [facility] 01/20/2017 12:20) In   Progress      CARDIAC ENZYMES X 3:    (SHILPI: 01/20/2017 12:20)  ( [facility] 01/20/2017 12:20)   In Progress      CK:    (SHILPI: 01/20/2017 12:57)  ( [facility] 01/20/2017 13:24) Final results     **Test**                                **Result**      **Flag**      **Units**      **(Reference)**   CK                                      196             H           U/L        ()          CK MB:    (SHILPI: 01/20/2017 12:57)  ( [facility] 01/20/2017 13:32) Final results     **Test**                                **Result**      **Flag**      **Units**      **(Reference)**   CK-MB                                   8.7             H           ng/ml      (0.0-5.0)         Troponin-I:    (SHILPI: 01/20/2017 12:57)  ( [facility] 01/20/2017 13:32) Final   results     **Test**                                **Result**      **Flag**      **Units**       **(Reference)**   Troponin-I                              0.161           AH          ng/ml      (0.000-0.034)    Rising and/or falling troponin values, in conjunction with symptoms, newECG   changes, or new imaging abnormalities suggestive of cardiac ischemiaare   consistent with acute myocardial injury. (Universal Definition ofMyocardial   Infarction, Circulation. 2007; 1857-5982.)DR MASCORRO READ BACK @ 1336      CK:    (SHILPI: 01/20/2017 12:20)  ( MsgRcvd 01/20/2017 12:21) In Progress      CK MB:    (SHILPI: 01/20/2017 12:20)  ( MsgRcvd 01/20/2017 12:21) In Progress      Troponin-I:    (SHILPI: 01/20/2017 12:20)  ( [facility] 12[facility] In   Progress      CK:    (SHILPI: 01/20/2017 12:20)  ( MsgRcvd 01/20/2017 12:21) In Progress      CK MB:    (SHILPI: 01/20/2017 12:20)  ( MsgRcvd 01/20/2017 12:21) In Progress      Troponin-I:    (SHILPI: 01/20/2017 12:20)  ( MsgRcvd 01/20/2017 12:21) In   Progress      CMP:    (SHILPI: 01/20/2017 12:57)  ( [facility] 01/20/2017 13:14) Final results     **Test**                                **Result**      **Flag**      **Units**      **(Reference)**   Sodium                                  137                         mmol/L     (137-145)        Potassium                               4.1                         mmol/L     (3.5-5.1)        Chloride                                101                         mmol/L     ()         CO2                                     23                          mmol/L     (22-31)          Anion Gap                               13.0                        mmol/L     (5.0-15.0)       Glucose                                 188             H           mg/dl      ()         BUN                                     14                          mg/dl      (7-21)           Creatinine                              1.3                         mg/dl      (0.7-1.3)        GFR (non AA)                            57                            mL/min/1.73 sq.  ()         Invalid if creatinine is changing or the patient is on dialysis. Use   AAresult if patient is -American, non AA result otherwise.     GFR (AA)                                69                            mL/min/1.73 sq. ()         Calcium, Total                          9.3                         mg/dl      (8.4-10.2)       Protein,Total                           6.9                         gm/dl      (6.3-8.6)        Albumin                                 3.8                         gm/dl      (3.4-4.8)        Bili., Total                            1.3                         mg/dl      (0.2-1.3)        Alk. Phos.                              55                          U/L        ()         AST                                     37                          U/L        (17-59)          ALT                                     35                          U/L        (21-72)           CBC:    (SHILPI: 01/20/2017 12:57)  ( MsgRcvd 01/20/2017 13:05) Final results     **Test**                                **Result**      **Flag**      **Units**      **(Reference)**   WBC                                     6.4                         x1000/uL   (3.2-9.8)        RBC                                     4.62                        franco/mm3   (4.37-5.74)      Hemoglobin                              13.0            L           gm/dl      (13.7-17.3)      Hematocrit                              36.7            L           %          (39.0-49.0)      MCV                                     79.4            L           fl         (80.0-98.0)      MCH                                     28.1                        pg         (26.0-34.0)      MCHC                                    35.4                        gm/dl      (31.5-36.3)      RDW                                     12.9                        %          (11.5-14.5)      Platelet Count                          209                            x1000/mm3       (150-450)        MPV                                     9.4                         fl         (8.0-12.0)       Auto Segs %                             56.7                        %          (37.0-80.0)      Auto Lymph %                            33.4                        %          (10.0-50.0)      Auto Mono %                             7.7                         %          (0.0-12.0)       Auto Eos %                              1.7                         %          (0.0-7.0)        Auto Baso %                             0.3                         %          (0.0-2.0)        Auto IG%                                0.20                        %          (0.00-0.50)      Auto Segs #                             3.62                        x1000/uL   (2.00-8.60)      Auto Lymph #                            2.13                        x1000/uL   (0.60-4.20)      Auto Mono #                             0.49                        x1000/uL   (0.00-0.90)      Auto Eos #                              0.11                        x1000/uL   (0.00-0.70)      Auto Baso #                             0.02                        x1000/uL   (0.00-0.20)      Auto IG#                                0.010                       x1000/uL   (0.005-0.022)    NRBC %                                  0.0                         %          (0.0-0.2)        NRBC #                                  0.000                       x1000/uL         .      PROGRESS AND PROCEDURES  Course of Care: 14:53 01/20/17. Elevated cardiac markers concerning for STEMI   with historical features c/w same.  UA.  Heparin gtt and NTG gtt initiated in   the ED.  Case discussed with Dr. Lutz and Dr. Omalley for further   managment.  To ICU.      Discussed case with on-call health care provider, (Lyssa 14:04 Jan 20 2017). Reviewed test results and need for additional work-up. Agreed upon   treatment plan and decision to admit.  Health care provider will see patient   in hospital.  Discussed case with patient's primary care provider, (14.20   Nelda). Reviewed test results and need for additional work-up. Agreed   upon treatment plan and decision to admit.  Patient counseled in person   several times regarding the patient's stable condition, test results,   diagnosis and need for admission. 14:55 Jan 20 2017.      Admission orders written (14:55 Jan 20 2017).      Disposition: Admitted.      CLINICAL IMPRESSION  Precordial chest pain.    Acute myocardial infarction with elevated markers and no ST elevation   (NSTEMI).                (Electronically signed by Aki Devries M.D. 01/20/2017 16:46)             Patient: JAX WANG  Clinical Report - Nurses   MRN: [id number] [facility]  VisitID: [id number] [address] [place], [place] [id number] [phone]  56y, M Registration Date/Time: 01/20/2017 12:08          TRIAGE    12:10 01/20/17.  BP: 104/71. HR: 62. RR: 22. O2 saturation: 97%. Temp: 97.5   F. Pain level now 8/10.  --12:13 Ban Del Rosario    Triage time 12:10 Jan 20 2017.  Acuity: LEVEL 2.    Chief Complaint: CHEST PAIN.  --12:13 Ban Del Rosario    SEPSIS SCREEN: Sepsis Screen: negative. Negative (no infection   suspected/documented). Respiratory rate greater than 20. Temperature not   greater than 38.3 degrees C (101 degrees F). Heart rate not greater than 90.   Systolic blood pressure not less than 90. Mean arterial pressure not less   than 65.  --12:14 Ban Del Rosario.    Weight: 95.2 kg stated. Height/Length: 68 inches Per Patient. BMI: 31.9.    --12:09 Ban Del Rosario.      Medications  Ambien Oral.  --16:09 Dylan Cali RN.      Allergies  No Known Drug Allergy.  --12:11 Ban Del Rosario.      History  Arrived by private vehicle.  Historian: patient.  Accompanied by family.    This started yesterday.  ( Pt. wife staes pt. has mid sternal CP that started   yesterday.).  He has had difficulty breathing and fatigue.   Reports   experiencing sweating episodes.      Treatment PTA:  None.  --12:13 Ban Del Rosario.      PROBLEMS:  Hyperlipidemia.  Insomnia.  --16:09 Dylan Cali, RN.      ADDITIONAL SURGERIES:  Appendectomy.  Rotator Cuff Surgery.  --16:09 Dylan Cali, RN.      Interventions  ID band on patient.  To treatment room.  No allergy band on patient.  --12:13   Ban Del Rosario.      PHYSICAL ASSESSMENT  GENERAL / NEURO / PSYCH: Alert.  Oriented X 4.  Appears in no acute distress.   HEENT: Mucous membranes are pink.    RESPIRATORY: Respirations not labored.  Chest wall tenderness.  Breath sounds   within normal limits.    CVS: Normal sinus rhythm noted.  Heart sounds within normal limits.  Pulses   within normal limits.  Capillary refill less than 2 seconds.    GI / : Abdomen soft and nontender.    EXTREMITIES: No lower extremity edema.    SKIN: Skin is warm and dry.  Normal skin turgor.  Skin is non-tender.    --12:37 Dylan Cali, RN.      NURSING PROGRESS NOTES  EKG time: (12:10 Jan 20 2017).  EKG was performed by a tech and shown to the   ED physician.  --12:15 Ban Del Rosario    Cardiac monitor, pulse oximeter and NIBP monitor placed on patient; monitor   alarms on.  Patient identifiers checked.  Call light placed in reach.  Side   rails up x 2.  Bed placed in lowest position.  Brakes of bed on.  --12:28   Rosa Mcfarland ER Tech2    12:45 01/20/2017 Site #1 started via IV in the right forearm with an 20g   angiocath, with aseptic technique and good blood return; one attempt. Blood   drawn: rainbow set. Labeled in the presence of the patient and sent to the   lab. Saline lock flushed with 10 mL saline.  --12:51 Temitope Smith    12:45 01/20/2017 ASA PO Tablets 325 mg given. Allergies verified and   confirmed 5 rights.  --12:51 Temitope Smith    12:51 01/20/17.  Patient returned from radiology by stretcher with tech.    --12:51 Dylan Cali, RN    13:18 01/20/2017 ZOFRAN (Ondansetron HCl) IVP 4 mg given. via site  #1.   Allergies verified and confirmed 5 rights. IV patency established. IV site   checked: no pain, redness, or swelling. IV flushed thoroughly pre- and   post-medication administration.  --13:22 Dylan Cali RN    13:19 01/20/2017 MORPHINE IVP 4 mg given. via site #1. Allergies verified and   confirmed 5 rights. IV patency established. IV site checked: no pain,   redness, or swelling. IV flushed thoroughly pre- and post-medication   administration.  --13:23 Dylan Cali RN    13:33 01/20/2017 MORPHINE IVP Response: pain is improving. Symptoms have   improved the patient feels better.  --13:33 Dylan Cali RN    14:44 01/20/2017 Started 500 mcg of NITROGLYCERIN Drip IV in bag #1 250 mL;   at 10 mcg/min via site #1 via IV pump. Allergies verified and confirmed 5   rights. IV patency established. IV site checked: no pain, redness, or   swelling. IV flushed thoroughly pre- and post-medication administration.    --14:44 Dylan Cali RN    14:45 01/20/17.  BP: 129/83. HR: 64. RR: 18. O2 saturation: 99%. Pain level   now: 5/10.  --14:45 Dylan Cali RN    15:00 01/20/2017 Site #2 started via IV in the left forearm with an 20g   angiocath, with aseptic technique and good blood return. Blood drawn. Labeled   in the presence of the patient.  --15:47 Dylan Cali RN    15:30 01/20/17.  ( rm [id number] assigned, faxed orders to Rx.).  --15:30 Larisa Alva, [facility] [place]    15:40 01/20/2017 HEPARIN IVP 5000 unit given. via site #2. Allergies verified   and confirmed 5 rights. IV patency established. IV site checked: no pain,   redness, or swelling. IV flushed thoroughly pre- and post-medication   administration.  --15:47 Dylan Cali RN    15:47 01/20/2017 Started [id number] unit of HEPARIN Drip IV in bag #1 250 mL; at   1000 unit/hr via site #2 via IV pump. Allergies verified and confirmed 5   rights. IV patency established. IV site checked: no pain, redness, or   swelling. IV flushed thoroughly pre- and  post-medication administration.    --15:47 Dylan Cali RN    16:00 01/20/17.  BP: 114/72. HR: 72. RR: 18. O2 saturation: 94%. Pain level   now 4/10.  --16:01 Cary Sánchez R.N.    16:15 01/20/2017 MORPHINE IVP 4 mg given. via site #1. Allergies verified and   confirmed 5 rights. IV patency established. IV site checked: no pain,   redness, or swelling. IV flushed thoroughly pre- and post-medication   administration.  --16:19 Dylan Cali RN    16:22 01/20/2017 HEPARIN Drip IV Discontinued: bag #1 STOPPED. Total amount   infused: 20 mL. IV patency established. IV site checked: no pain, redness, or   swelling. IV flushed thoroughly. (per Dr Peraza).  --16:22 Bj Rosas R.N.    16:35 01/20/2017 NITROGLYCERIN Drip IV Continued: upon admission at the rate   of 10 mcg/min bag #1. IV patency established. IV site checked: no pain,   redness, or swelling. IV flushed thoroughly.  --16:42 Dylan Cali RN.      DISPOSITION / DISCHARGE  16:42 01/20/17.  Admitted via the Cath-Lab.  Report was given to a nurse via   a phone call. Report included patient's care, treatment, medications,   reviewed medication reconcilliation, and condition (including any recent   changes or anticipated changes).  --16:42 Dylan Cali RN    Departure time: 16:42 Jan 20 2017.  --16:43 Dylan Cali RN.            Locked/Released at 01/20/2017 16:43 by Dylan Cali RN

## 2017-01-21 PROBLEM — I21.4 NON Q WAVE MYOCARDIAL INFARCTION (HCC): Status: ACTIVE | Noted: 2017-01-21

## 2017-01-21 PROBLEM — E11.9 DIABETES MELLITUS (HCC): Status: ACTIVE | Noted: 2017-01-21

## 2017-01-21 PROBLEM — I10 ESSENTIAL HYPERTENSION: Status: ACTIVE | Noted: 2017-01-21

## 2017-01-21 LAB
ANION GAP SERPL CALCULATED.3IONS-SCNC: 11 MMOL/L (ref 5–15)
ARTICHOKE IGE QN: 117 MG/DL (ref 1–129)
BASOPHILS # BLD AUTO: 0 10*3/MM3 (ref 0–0.2)
BASOPHILS NFR BLD AUTO: 0 % (ref 0–2)
BUN BLD-MCNC: 13 MG/DL (ref 7–21)
BUN/CREAT SERPL: 11.2 (ref 7–25)
CALCIUM SPEC-SCNC: 9 MG/DL (ref 8.4–10.2)
CHLORIDE SERPL-SCNC: 102 MMOL/L (ref 95–110)
CHOLEST SERPL-MCNC: 210 MG/DL (ref 0–199)
CO2 SERPL-SCNC: 24 MMOL/L (ref 22–31)
CREAT BLD-MCNC: 1.16 MG/DL (ref 0.7–1.3)
DEPRECATED RDW RBC AUTO: 37.9 FL (ref 35.1–43.9)
EOSINOPHIL # BLD AUTO: 0.01 10*3/MM3 (ref 0–0.7)
EOSINOPHIL NFR BLD AUTO: 0.1 % (ref 0–7)
ERYTHROCYTE [DISTWIDTH] IN BLOOD BY AUTOMATED COUNT: 13.2 % (ref 11.5–14.5)
GFR SERPL CREATININE-BSD FRML MDRD: 78 ML/MIN/1.73 (ref 56–130)
GLUCOSE BLD-MCNC: 162 MG/DL (ref 60–100)
GLUCOSE BLDC GLUCOMTR-MCNC: 157 MG/DL (ref 70–130)
GLUCOSE BLDC GLUCOMTR-MCNC: 172 MG/DL (ref 70–130)
GLUCOSE BLDC GLUCOMTR-MCNC: 172 MG/DL (ref 70–130)
HCT VFR BLD AUTO: 32.9 % (ref 39–49)
HDLC SERPL-MCNC: 52 MG/DL (ref 60–200)
HGB BLD-MCNC: 11.6 G/DL (ref 13.7–17.3)
IMM GRANULOCYTES # BLD: 0.02 10*3/MM3 (ref 0–0.02)
IMM GRANULOCYTES NFR BLD: 0.3 % (ref 0–0.5)
LDLC/HDLC SERPL: 2.27 {RATIO} (ref 0–3.55)
LYMPHOCYTES # BLD AUTO: 1.35 10*3/MM3 (ref 0.6–4.2)
LYMPHOCYTES NFR BLD AUTO: 20.1 % (ref 10–50)
MCH RBC QN AUTO: 27.9 PG (ref 26.5–34)
MCHC RBC AUTO-ENTMCNC: 35.3 G/DL (ref 31.5–36.3)
MCV RBC AUTO: 79.1 FL (ref 80–98)
MONOCYTES # BLD AUTO: 0.5 10*3/MM3 (ref 0–0.9)
MONOCYTES NFR BLD AUTO: 7.4 % (ref 0–12)
NEUTROPHILS # BLD AUTO: 4.85 10*3/MM3 (ref 2–8.6)
NEUTROPHILS NFR BLD AUTO: 72.1 % (ref 37–80)
PLATELET # BLD AUTO: 200 10*3/MM3 (ref 150–450)
PMV BLD AUTO: 9.5 FL (ref 8–12)
POTASSIUM BLD-SCNC: 4.1 MMOL/L (ref 3.5–5.1)
RBC # BLD AUTO: 4.16 10*6/MM3 (ref 4.37–5.74)
SODIUM BLD-SCNC: 137 MMOL/L (ref 137–145)
TRIGL SERPL-MCNC: 201 MG/DL (ref 20–199)
TROPONIN I SERPL-MCNC: 1.63 NG/ML
WBC NRBC COR # BLD: 6.73 10*3/MM3 (ref 3.2–9.8)

## 2017-01-21 PROCEDURE — 82962 GLUCOSE BLOOD TEST: CPT

## 2017-01-21 PROCEDURE — 63710000001 INSULIN ASPART PER 5 UNITS: Performed by: FAMILY MEDICINE

## 2017-01-21 PROCEDURE — 85025 COMPLETE CBC W/AUTO DIFF WBC: CPT | Performed by: INTERNAL MEDICINE

## 2017-01-21 PROCEDURE — 80061 LIPID PANEL: CPT | Performed by: INTERNAL MEDICINE

## 2017-01-21 PROCEDURE — 80048 BASIC METABOLIC PNL TOTAL CA: CPT | Performed by: INTERNAL MEDICINE

## 2017-01-21 PROCEDURE — 84484 ASSAY OF TROPONIN QUANT: CPT | Performed by: INTERNAL MEDICINE

## 2017-01-21 PROCEDURE — 99232 SBSQ HOSP IP/OBS MODERATE 35: CPT | Performed by: INTERNAL MEDICINE

## 2017-01-21 RX ORDER — NITROGLYCERIN 20 MG/100ML
20 INJECTION INTRAVENOUS
Status: DISCONTINUED | OUTPATIENT
Start: 2017-01-21 | End: 2017-01-21

## 2017-01-21 RX ORDER — NICOTINE POLACRILEX 4 MG
15 LOZENGE BUCCAL
Status: DISCONTINUED | OUTPATIENT
Start: 2017-01-21 | End: 2017-01-22 | Stop reason: HOSPADM

## 2017-01-21 RX ORDER — DEXTROSE MONOHYDRATE 25 G/50ML
25 INJECTION, SOLUTION INTRAVENOUS
Status: DISCONTINUED | OUTPATIENT
Start: 2017-01-21 | End: 2017-01-22 | Stop reason: HOSPADM

## 2017-01-21 RX ORDER — SODIUM CHLORIDE 0.9 % (FLUSH) 0.9 %
3 SYRINGE (ML) INJECTION AS NEEDED
Status: DISCONTINUED | OUTPATIENT
Start: 2017-01-21 | End: 2017-01-22 | Stop reason: HOSPADM

## 2017-01-21 RX ORDER — ZOLPIDEM TARTRATE 5 MG/1
10 TABLET ORAL NIGHTLY
Status: DISCONTINUED | OUTPATIENT
Start: 2017-01-21 | End: 2017-01-21

## 2017-01-21 RX ADMIN — HYDROCODONE BITARTRATE AND ACETAMINOPHEN 1 TABLET: 7.5; 325 TABLET ORAL at 17:59

## 2017-01-21 RX ADMIN — METOPROLOL TARTRATE 25 MG: 25 TABLET ORAL at 20:49

## 2017-01-21 RX ADMIN — INSULIN ASPART 2 UNITS: 100 INJECTION, SOLUTION INTRAVENOUS; SUBCUTANEOUS at 20:49

## 2017-01-21 RX ADMIN — TICAGRELOR 90 MG: 90 TABLET ORAL at 18:10

## 2017-01-21 RX ADMIN — ATORVASTATIN CALCIUM 40 MG: 40 TABLET, FILM COATED ORAL at 21:05

## 2017-01-21 RX ADMIN — METOPROLOL TARTRATE 25 MG: 25 TABLET ORAL at 08:10

## 2017-01-21 RX ADMIN — SODIUM CHLORIDE, PRESERVATIVE FREE 3 ML: 5 INJECTION INTRAVENOUS at 08:19

## 2017-01-21 RX ADMIN — INSULIN ASPART 2 UNITS: 100 INJECTION, SOLUTION INTRAVENOUS; SUBCUTANEOUS at 18:04

## 2017-01-21 RX ADMIN — SODIUM CHLORIDE, PRESERVATIVE FREE 3 ML: 5 INJECTION INTRAVENOUS at 08:18

## 2017-01-21 RX ADMIN — INSULIN ASPART 2 UNITS: 100 INJECTION, SOLUTION INTRAVENOUS; SUBCUTANEOUS at 11:57

## 2017-01-21 RX ADMIN — SERTRALINE HYDROCHLORIDE 50 MG: 50 TABLET ORAL at 11:57

## 2017-01-21 RX ADMIN — NITROGLYCERIN 1 INCH: 20 OINTMENT TOPICAL at 06:31

## 2017-01-21 RX ADMIN — ASPIRIN 81 MG 81 MG: 81 TABLET ORAL at 08:11

## 2017-01-21 RX ADMIN — ZOLPIDEM TARTRATE 10 MG: 5 TABLET, FILM COATED ORAL at 01:55

## 2017-01-21 RX ADMIN — NITROGLYCERIN 20 MCG/MIN: 20 INJECTION INTRAVENOUS at 04:47

## 2017-01-21 RX ADMIN — TICAGRELOR 90 MG: 90 TABLET ORAL at 08:10

## 2017-01-21 RX ADMIN — TEMAZEPAM 15 MG: 15 CAPSULE ORAL at 20:49

## 2017-01-21 NOTE — PLAN OF CARE
Problem: Patient Care Overview (Adult)  Goal: Plan of Care Review  Outcome: Ongoing (interventions implemented as appropriate)  Goal: Adult Individualization and Mutuality  Outcome: Ongoing (interventions implemented as appropriate)  Goal: Discharge Needs Assessment  Outcome: Ongoing (interventions implemented as appropriate)    Problem: Infection, Risk/Actual (Adult)  Goal: Identify Related Risk Factors and Signs and Symptoms  Outcome: Ongoing (interventions implemented as appropriate)  Goal: Infection Prevention/Resolution  Outcome: Ongoing (interventions implemented as appropriate)    Problem: Fall Risk (Adult)  Goal: Absence of Falls  Outcome: Outcome(s) achieved Date Met:  01/21/17    Problem: Pain, Acute (Adult)  Goal: Identify Related Risk Factors and Signs and Symptoms  Outcome: Ongoing (interventions implemented as appropriate)  Goal: Acceptable Pain Control/Comfort Level  Outcome: Ongoing (interventions implemented as appropriate)    Problem: Cardiac Rhythm Management Device (Adult)  Goal: Signs and Symptoms of Listed Potential Problems Will be Absent or Manageable (Cardiac Rhythm Management Device)  Outcome: Ongoing (interventions implemented as appropriate)

## 2017-01-21 NOTE — PROGRESS NOTES
"Patient Name: Eitan Richards  :1960  56 y.o.      Patient Care Team:  No Known Provider as PCP - General      Subjective:  56-year-old gentleman who had PCI to the right coronary artery yesterday, attained only OLIMPIA 2 flow.  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  He has some chest pain when he gets up or moves around predominantly with position.    Objective   Vital Signs  Temp:  [98.6 °F (37 °C)-98.7 °F (37.1 °C)] 98.7 °F (37.1 °C)  Heart Rate:  [61-79] 79  Resp:  [14-18] 14  BP: (109-133)/(60-86) 110/60    Intake/Output Summary (Last 24 hours) at 17 1038  Last data filed at 17 0559   Gross per 24 hour   Intake    132 ml   Output    420 ml   Net   -288 ml     Flowsheet Rows         First Filed Value    Admission Height  71\" (180.3 cm) Documented at 2017    Admission Weight  213 lb 6.5 oz (96.8 kg) Documented at 2017      Physical Exam   Constitutional: He is oriented to person, place, and time. He appears well-developed.   HENT:   Head: Normocephalic.   Eyes: Pupils are equal, round, and reactive to light.   Neck: Neck supple. No JVD present.   Cardiovascular: Normal rate, regular rhythm and intact distal pulses.    No murmur heard.  Pulmonary/Chest: Effort normal and breath sounds normal. No respiratory distress.   Abdominal: Soft. Bowel sounds are normal.   Musculoskeletal: Normal range of motion. He exhibits no edema.   Neurological: He is alert and oriented to person, place, and time. He displays normal reflexes. No cranial nerve deficit. He exhibits normal muscle tone.   Skin: Skin is warm and dry.         Results Review:      Results from last 7 days  Lab Units 17  0413   SODIUM mmol/L 137   POTASSIUM mmol/L 4.1   CHLORIDE mmol/L 102   TOTAL CO2 mmol/L 24.0   BUN mg/dL 13   CREATININE mg/dL 1.16   GLUCOSE mg/dL 162*   CALCIUM mg/dL 9.0       Results from last 7 days  Lab Units 17  0413 " 01/20/17  1835 01/20/17  1542 01/20/17  1257   CK TOTAL U/L  --  256* 209* 196*   TROPONIN I ng/mL 1.630* 0.150* 0.160* 0.161*       Results from last 7 days  Lab Units 01/21/17  0413   WBC 10*3/mm3 6.73   HEMOGLOBIN g/dL 11.6*   HEMATOCRIT % 32.9*   PLATELETS 10*3/mm3 200           Results from last 7 days  Lab Units 01/21/17  0413   CHOLESTEROL mg/dL 210*           Results from last 7 days  Lab Units 01/21/17  0413   CHOLESTEROL mg/dL 210*   TRIGLYCERIDES mg/dL 201*   HDL CHOL mg/dL 52*         Medication Review:     aspirin 81 mg Oral Daily   atorvastatin 40 mg Oral Nightly   insulin aspart 0-9 Units Subcutaneous 4x Daily AC & at Bedtime   metoprolol tartrate 25 mg Oral Q12H   nitroglycerin 1 inch Topical Q6H   ticagrelor 90 mg Oral BID   zolpidem 10 mg Oral Nightly          nitroglycerin 20 mcg/min Last Rate: Stopped (01/21/17 0700)       Assessment/Plan     Non-ST elevation MI-status post PCI to the distal right coronary artery, started on Brilinta, aspirin.  He still is some chest pain with position and movement.    Hypertension-controlled with Lopressor.    Hyperlipidemia-started on atorvastatin 40 mg daily.    Diabetes-hemoglobin A1c 6.3 will start him on metformin in 2 days.    Ajith Lutz MD, [facility], [facility]  01/21/17  10:38 AM

## 2017-01-21 NOTE — PLAN OF CARE
Problem: Patient Care Overview (Adult)  Goal: Plan of Care Review  Outcome: Ongoing (interventions implemented as appropriate)    01/21/17 1229   Patient Care Overview   Progress improving   Coping/Psychosocial Response Interventions   Plan Of Care Reviewed With patient   Pt is stable post heart craterization. Pt transferring to floor. Right groin site WDL.   Goal: Adult Individualization and Mutuality  Outcome: Ongoing (interventions implemented as appropriate)  Goal: Discharge Needs Assessment  Outcome: Ongoing (interventions implemented as appropriate)    01/21/17 1229   Discharge Needs Assessment   Concerns To Be Addressed medication concerns   Readmission Within The Last 30 Days no previous admission in last 30 days   Equipment Needed After Discharge none   Discharge Disposition home or self-care   Current Health   Anticipated Changes Related to Illness none   Self-Care   Equipment Currently Used at Home none   Living Environment   Transportation Available family or friend will provide         Problem: Infection, Risk/Actual (Adult)  Goal: Identify Related Risk Factors and Signs and Symptoms  Outcome: Ongoing (interventions implemented as appropriate)  Goal: Infection Prevention/Resolution  Outcome: Ongoing (interventions implemented as appropriate)    Problem: Fall Risk (Adult)  Goal: Identify Related Risk Factors and Signs and Symptoms  Outcome: Outcome(s) achieved Date Met:  01/21/17  Goal: Absence of Falls  Outcome: Ongoing (interventions implemented as appropriate)    Problem: Pain, Acute (Adult)  Goal: Acceptable Pain Control/Comfort Level  Outcome: Ongoing (interventions implemented as appropriate)    Problem: Cardiac Rhythm Management Device (Adult)  Goal: Signs and Symptoms of Listed Potential Problems Will be Absent or Manageable (Cardiac Rhythm Management Device)  Outcome: Ongoing (interventions implemented as appropriate)

## 2017-01-21 NOTE — PROGRESS NOTES
[facility] Services  INPATIENT PROGRESS NOTE    Length of Stay: 1  Date of Admission: 1/20/2017  Primary Care Physician: No Known Provider    Subjective   Chief Complaint:  Chest pain  HPI: 56 year old presented with chest pain, found to have elevated Troponin, Patient under went cardiac catheterization and stenting on 1/20/17.   This morning patient is doing better, Mild chest pain grades it 1/10. Worse with taking in deep breath and trying to sit.  No fever or chills, No cough, No Nausea or vomiting.    Review of Systems   All pertinent negatives and positives are as above. All other systems have been reviewed and are negative unless otherwise stated.     Objective    Temp:  [98.6 °F (37 °C)-98.7 °F (37.1 °C)] 98.7 °F (37.1 °C)  Heart Rate:  [61-79] 79  Resp:  [14-18] 14  BP: (109-133)/(60-86) 110/60   Body mass index is 29.76 kg/(m^2).     Physical Exam    General:  Well appearing, well nourished, in no distress. Oriented x 3.    HEENT:  Head: Normocephalic, atraumatic.  Eyes: sclera non-icteric, EOM intact, PERRL.  Ears: Hearing to normal conversation.  Mouth: Mucous membranes moist.  Pharynx: Mucosa non-inflamed, no tonsillar hypertrophy or exudate  Neck: Supple, adenopathy, non-tender    Heart: No cardiomegaly or thrills; regular rate and rhythm, no murmur or gallop    Lungs: Clear to auscultation. Decreased breath sounds bilaterally.    Abdomen: Bowel sounds normal, no tenderness.     Extremities:  No edema, peripheral pulses intact.    Neurologic: CN 2-12 normal. No motor deficits, No obvious sensory loss, No exaggerated reflexes.    Psychiatric: Oriented X3, intact recent and remote memory, judgment and insight, normal mood  and affect.        Results Review:  I have reviewed the labs, radiology results, and diagnostic studies.    Laboratory Data:   Lab Results (last 24 hours)     Procedure Component Value Units Date/Time    CBC & Differential [[id number]]   (Abnormal) Collected:  01/20/17 1257    Specimen:  Blood Updated:  01/20/17 1305     WBC 6.4 x1000/uL      RBC 4.62 franco/mm3      Hemoglobin 13.0 (L) gm/dl      Hematocrit 36.7 (L) %      MCV 79.4 (L) fl      MCH 28.1 pg      MCHC 35.4 gm/dl      RDW 12.9 %      Platelets 209 x1000/mm3      MPV 9.4 fl      Neutrophil Rel % 56.7 %      Lymphocyte Rel % 33.4 %      Monocyte Rel % 7.7 %      Eosinophil Rel % 1.7 %      Basophil Rel % 0.3 %      Immature Granulocyte Rel % 0.20 %      Neutrophils Absolute 3.62 x1000/uL      Lymphocytes Absolute 2.13 x1000/uL      Monocytes Absolute 0.49 x1000/uL      Eosinophils Absolute 0.11 x1000/uL      Basophils Absolute 0.02 x1000/uL      Immature Granulocytes Absolute 0.010 x1000/uL      nRBC 0.0 %      nRBC 0.000 x1000/uL     Narrative:       Specimen Type : Blood    Comprehensive Metabolic Panel [[id number]]  (Abnormal) Collected:  01/20/17 1257    Specimen:  Blood Updated:  01/20/17 1314     Sodium 137 mmol/L      Potassium 4.1 mmol/L      Chloride 101 mmol/L      CO2 23 mmol/L      Anion Gap 13.0 mmol/L      Glucose 188 (H) mg/dl      BUN 14 mg/dl      Creatinine 1.3 mg/dl      GFR MDRD Non African American 57 mL/min/1.73 sq.M       Invalid if creatinine is changing or the patient is on dialysis. Use AA  result if patient is -American, non AA result otherwise.          GFR MDRD  69 mL/min/1.73 sq.M      Calcium 9.3 mg/dl      Total Protein 6.9 gm/dl      Albumin 3.8 gm/dl      Total Bilirubin 1.3 mg/dl      Alkaline Phosphatase 55 U/L      AST (SGOT) 37 U/L      ALT (SGPT) 35 U/L     Narrative:       Specimen Type : Blood    CK [[id number]]  (Abnormal) Collected:  01/20/17 1257    Specimen:  Blood Updated:  01/20/17 1324     Creatine Kinase 196 (H) U/L     Narrative:       Specimen Type : Blood    CK-MB [[id number]]  (Abnormal) Collected:  01/20/17 1257    Specimen:  Blood Updated:  01/20/17 1332     CKMB 8.7 (H) ng/ml     Narrative:       Specimen Type : Blood     Troponin [[id number]]  (Abnormal) Collected:  01/20/17 1257    Specimen:  Blood Updated:  01/20/17 1332     Troponin I 0.161 (H) ng/ml       Rising and/or falling troponin values, in conjunction with symptoms, new  ECG changes, or new imaging abnormalities suggestive of cardiac ischemia  are consistent with acute myocardial injury. (Universal Definition of  Myocardial Infarction, Circulation. 2007; 8760-7530.)  DR MASCORRO READ BACK @ 1331         Narrative:       Specimen Type : Blood    CK [[id number]]  (Abnormal) Collected:  01/20/17 1542    Specimen:  Blood Updated:  01/20/17 1612     Creatine Kinase 209 (H) U/L     Narrative:       Specimen Type : Blood    CK-MB [[id number]]  (Abnormal) Collected:  01/20/17 1542    Specimen:  Blood Updated:  01/20/17 1635     CKMB 8.8 (H) ng/ml     Narrative:       Specimen Type : Blood    Troponin [[id number]]  (Abnormal) Collected:  01/20/17 1542    Specimen:  Blood Updated:  01/20/17 1635     Troponin I 0.160 (H) ng/ml       Rising and/or falling troponin values, in conjunction with symptoms, new  ECG changes, or new imaging abnormalities suggestive of cardiac ischemia  are consistent with acute myocardial injury. (Universal Definition of  Myocardial Infarction, Circulation. 2007; 3024-2609.)  CALLED TO RORY MARC RN  @ 1635 READ BACK         Narrative:       Specimen Type : Blood    CK [[id number]]  (Abnormal) Collected:  01/20/17 1835    Specimen:  Blood Updated:  01/20/17 1923     Creatine Kinase 256 (H) U/L     Narrative:       Specimen Type : Blood    CK-MB [[id number]]  (Abnormal) Collected:  01/20/17 1835    Specimen:  Blood Updated:  01/20/17 1935     CKMB 14.5 (H) ng/ml     Narrative:       Specimen Type : Blood    Troponin [[id number]]  (Abnormal) Collected:  01/20/17 1835    Specimen:  Blood Updated:  01/20/17 1935     Troponin I 0.150 (H) ng/ml       CALLED TO ARISTEO OTT RN @ 1935 READ BACK  Rising and/or falling troponin values, in conjunction with symptoms, new  ECG  changes, or new imaging abnormalities suggestive of cardiac ischemia  are consistent with acute myocardial injury. (Universal Definition of  Myocardial Infarction, Circulation. 2007; 6487-6672.)         Narrative:       Specimen Type : Blood    Hemoglobin A1c [[id number]]  (Abnormal) Collected:  01/20/17 1257    Specimen:  Blood Updated:  01/20/17 2228     Hemoglobin A1C 6.6 (H) %TotHgb     Narrative:       Specimen Type : WHOLE BLOOD    CBC & Differential [[id number]] Collected:  01/21/17 0413    Specimen:  Blood Updated:  01/21/17 0443    Narrative:       The following orders were created for panel order CBC & Differential.  Procedure                               Abnormality         Status                     ---------                               -----------         ------                     CBC Auto Differential[[id number]]         Abnormal            Final result                 Please view results for these tests on the individual orders.    CBC Auto Differential [[id number]]  (Abnormal) Collected:  01/21/17 0413    Specimen:  Blood Updated:  01/21/17 0443     WBC 6.73 10*3/mm3      RBC 4.16 (L) 10*6/mm3      Hemoglobin 11.6 (L) g/dL      Hematocrit 32.9 (L) %      MCV 79.1 (L) fL      MCH 27.9 pg      MCHC 35.3 g/dL      RDW 13.2 %      RDW-SD 37.9 fl      MPV 9.5 fL      Platelets 200 10*3/mm3      Neutrophil % 72.1 %      Lymphocyte % 20.1 %      Monocyte % 7.4 %      Eosinophil % 0.1 %      Basophil % 0.0 %      Immature Grans % 0.3 %      Neutrophils, Absolute 4.85 10*3/mm3      Lymphocytes, Absolute 1.35 10*3/mm3      Monocytes, Absolute 0.50 10*3/mm3      Eosinophils, Absolute 0.01 10*3/mm3      Basophils, Absolute 0.00 10*3/mm3      Immature Grans, Absolute 0.02 10*3/mm3     Basic Metabolic Panel [[id number]]  (Abnormal) Collected:  01/21/17 0413    Specimen:  Blood Updated:  01/21/17 0555     Glucose 162 (H) mg/dL      BUN 13 mg/dL      Creatinine 1.16 mg/dL      Sodium 137 mmol/L      Potassium 4.1 mmol/L       Chloride 102 mmol/L      CO2 24.0 mmol/L      Calcium 9.0 mg/dL      eGFR  African Amer 78 mL/min/1.73      BUN/Creatinine Ratio 11.2      Anion Gap 11.0 mmol/L     Lipid Panel [[id number]]  (Abnormal) Collected:  01/21/17 0413    Specimen:  Blood Updated:  01/21/17 0606     Total Cholesterol 210 (H) mg/dL      Triglycerides 201 (H) mg/dL      HDL Cholesterol 52 (L) mg/dL      LDL Cholesterol  117 mg/dL      LDL/HDL Ratio 2.27     Troponin [[id number]]  (Abnormal) Collected:  01/21/17 0413    Specimen:  Blood Updated:  01/21/17 0614     Troponin I 1.630 (C) ng/mL           Culture Data:   No results found for: BLOODCX  No results found for: URINECX  No results found for: RESPCX  No results found for: WOUNDCX  No results found for: STOOLCX  No components found for: BODYFLD    Radiology Data:   Imaging Results (last 24 hours)     Procedure Component Value Units Date/Time    XR Chest 1 View [[id number]] Collected:  01/20/17 1246     Updated:  01/20/17 1248    Narrative:       Patient Name-  JAX WANG  Patient ID-  [id number]   Ordering-  FAUSTINO MASCORRO MD  Attending-  DR TEMP  Referring-       ------------------------------------------------  Chest single view  CLINICAL INDICATION- Shortness of breath. Chest pain     COMPARISON- Chest November 18, 2015.     FINDINGS- Cardiac silhouette is normal in size. Pulmonary vascularity  is unremarkable.      No focal infiltrate or consolidation.  No pleural effusion.  No  pneumothorax.     CONCLUSION- No evidence of active disease.     Electronically Signed By- Will Rodrigues MD On: 2017-01-20 12:46:24          I have reviewed the patient current medications.       Current Facility-Administered Medications:   •  aspirin chewable tablet 81 mg, 81 mg, Oral, Daily, John Omalley MD, 81 mg at 01/21/17 0811  •  atorvastatin (LIPITOR) tablet 40 mg, 40 mg, Oral, Nightly, John Omalley MD  •  dextrose (D50W) solution 25 g, 25 g, Intravenous, Q15 Min PRN, Stephen Corey MD  •   dextrose (GLUTOSE) oral gel 15 g, 15 g, Oral, Q15 Min PRN, Stephen Corey MD  •  glucagon (human recombinant) (GLUCAGEN DIAGNOSTIC) injection 1 mg, 1 mg, Subcutaneous, Q15 Min PRN, Stephen Corey MD  •  HYDROcodone-acetaminophen (NORCO) 7.5-325 MG per tablet 1 tablet, 1 tablet, Oral, Q6H PRN, John Omalley MD  •  HYDROmorphone (DILAUDID) injection 0.2 mg, 0.2 mg, Intravenous, Q2H PRN, John Omalley MD  •  insulin aspart (novoLOG) injection 0-9 Units, 0-9 Units, Subcutaneous, 4x Daily AC & at Bedtime, Stephen Corey MD  •  metoprolol tartrate (LOPRESSOR) tablet 25 mg, 25 mg, Oral, Q12H, John Omalley MD, 25 mg at 01/21/17 0810  •  ondansetron (ZOFRAN) injection 4 mg, 4 mg, Intravenous, Q6H PRN, John Omalley MD  •  senna-docusate (PERICOLACE) 8.6-50 MG per tablet 1 tablet, 1 tablet, Oral, Daily PRN, John Omalley MD  •  sertraline (ZOLOFT) tablet 50 mg, 50 mg, Oral, Daily, Stephen Corey MD  •  sodium chloride 0.9 % flush 3 mL, 3 mL, Intravenous, PRN, John Omalley MD, 3 mL at 01/21/17 0819  •  temazepam (RESTORIL) capsule 15 mg, 15 mg, Oral, Nightly PRN, John Omalley MD  •  ticagrelor (BRILINTA) tablet 90 mg, 90 mg, Oral, BID, John Omalley MD, 90 mg at 01/21/17 0810  •  zolpidem (AMBIEN) tablet 10 mg, 10 mg, Oral, Nightly, Alvin Fiore MD, 10 mg at 01/21/17 0155            Dietary Orders            Start     Ordered    01/21/17 0300  Diet Regular; Cardiac  Diet Effective Now     Question Answer Comment   Diet Texture / Consistency Regular    Common Modifiers Cardiac        01/20/17 3245          Assessment/Plan     Hospital Problem List     1. Non Q wave myocardial infarction-   S/P Cardiac craterization and stenting, Now on Aspirin and Brilinta, Also on Lipitor and Metoprolol, Not on ACEI due to low blood pressure.         2. Essential hypertension-   On Metoprolol wrill add ACEI able to tolerate     3. New diagnosis DM- A1c 6.6.  Currently on ADA diet and on insulin sliding  scale.  Plan to stat on metformin before discharge.    4. Depression   Continue with zoloft    5. DVT prophylaxis- encourage ambulation, PADUA score < 4.                 Transfer to floor with telemetry.    Stephen Corey MD   01/21/17   10:56 AM

## 2017-01-22 ENCOUNTER — APPOINTMENT (OUTPATIENT)
Dept: CARDIOLOGY | Facility: HOSPITAL | Age: 57
End: 2017-01-22
Attending: FAMILY MEDICINE

## 2017-01-22 VITALS
BODY MASS INDEX: 29.76 KG/M2 | HEIGHT: 71 IN | WEIGHT: 212.6 LBS | DIASTOLIC BLOOD PRESSURE: 63 MMHG | OXYGEN SATURATION: 98 % | TEMPERATURE: 98.7 F | RESPIRATION RATE: 20 BRPM | HEART RATE: 74 BPM | SYSTOLIC BLOOD PRESSURE: 130 MMHG

## 2017-01-22 LAB
ALBUMIN SERPL-MCNC: 3.4 G/DL (ref 3.4–4.8)
ALBUMIN/GLOB SERPL: 1.2 G/DL (ref 1.1–1.8)
ALP SERPL-CCNC: 46 U/L (ref 38–126)
ALT SERPL W P-5'-P-CCNC: 44 U/L (ref 21–72)
ANION GAP SERPL CALCULATED.3IONS-SCNC: 7 MMOL/L (ref 5–15)
AST SERPL-CCNC: 113 U/L (ref 17–59)
BH CV ECHO MEAS - ACS: 2.2 CM
BH CV ECHO MEAS - AO MAX PG (FULL): 2.1 MMHG
BH CV ECHO MEAS - AO MAX PG: 6.2 MMHG
BH CV ECHO MEAS - AO MEAN PG (FULL): 2 MMHG
BH CV ECHO MEAS - AO MEAN PG: 4 MMHG
BH CV ECHO MEAS - AO ROOT AREA: 8 CM^2
BH CV ECHO MEAS - AO ROOT DIAM: 3.2 CM
BH CV ECHO MEAS - AO V2 MAX: 124 CM/SEC
BH CV ECHO MEAS - AO V2 MEAN: 93.7 CM/SEC
BH CV ECHO MEAS - AO V2 VTI: 25.7 CM
BH CV ECHO MEAS - AVA(I,A): 3.3 CM^2
BH CV ECHO MEAS - AVA(I,D): 3.3 CM^2
BH CV ECHO MEAS - AVA(V,A): 3.7 CM^2
BH CV ECHO MEAS - AVA(V,D): 3.7 CM^2
BH CV ECHO MEAS - EDV(CUBED): 117.6 ML
BH CV ECHO MEAS - EDV(TEICH): 112.8 ML
BH CV ECHO MEAS - EF(CUBED): 49.6 %
BH CV ECHO MEAS - EF(MOD-SP4): 55 %
BH CV ECHO MEAS - EF(TEICH): 41.6 %
BH CV ECHO MEAS - ESV(CUBED): 59.3 ML
BH CV ECHO MEAS - ESV(TEICH): 65.9 ML
BH CV ECHO MEAS - FS: 20.4 %
BH CV ECHO MEAS - IVS/LVPW: 1
BH CV ECHO MEAS - IVSD: 1.2 CM
BH CV ECHO MEAS - LV MASS(C)D: 226.4 GRAMS
BH CV ECHO MEAS - LV MAX PG: 4.1 MMHG
BH CV ECHO MEAS - LV MEAN PG: 2 MMHG
BH CV ECHO MEAS - LV V1 MAX: 101 CM/SEC
BH CV ECHO MEAS - LV V1 MEAN: 59.1 CM/SEC
BH CV ECHO MEAS - LV V1 VTI: 18.7 CM
BH CV ECHO MEAS - LVIDD: 4.9 CM
BH CV ECHO MEAS - LVIDS: 3.9 CM
BH CV ECHO MEAS - LVOT AREA (M): 4.5 CM^2
BH CV ECHO MEAS - LVOT AREA: 4.5 CM^2
BH CV ECHO MEAS - LVOT DIAM: 2.4 CM
BH CV ECHO MEAS - LVPWD: 1.2 CM
BH CV ECHO MEAS - MR MAX PG: 38.9 MMHG
BH CV ECHO MEAS - MR MAX VEL: 312 CM/SEC
BH CV ECHO MEAS - MV A MAX VEL: 70.6 CM/SEC
BH CV ECHO MEAS - MV DEC SLOPE: 376 CM/SEC^2
BH CV ECHO MEAS - MV E MAX VEL: 69.4 CM/SEC
BH CV ECHO MEAS - MV E/A: 0.98
BH CV ECHO MEAS - MV P1/2T MAX VEL: 91.6 CM/SEC
BH CV ECHO MEAS - MV P1/2T: 71.4 MSEC
BH CV ECHO MEAS - MVA P1/2T LCG: 2.4 CM^2
BH CV ECHO MEAS - MVA(P1/2T): 3.1 CM^2
BH CV ECHO MEAS - PA MAX PG: 1 MMHG
BH CV ECHO MEAS - PA V2 MAX: 50.8 CM/SEC
BH CV ECHO MEAS - SV(AO): 206.7 ML
BH CV ECHO MEAS - SV(CUBED): 58.3 ML
BH CV ECHO MEAS - SV(LVOT): 84.6 ML
BH CV ECHO MEAS - SV(TEICH): 46.9 ML
BH CV ECHO MEAS - TR MAX VEL: 264 CM/SEC
BILIRUB SERPL-MCNC: 1.2 MG/DL (ref 0.2–1.3)
BUN BLD-MCNC: 16 MG/DL (ref 7–21)
BUN/CREAT SERPL: 11.7 (ref 7–25)
CALCIUM SPEC-SCNC: 8.7 MG/DL (ref 8.4–10.2)
CHLORIDE SERPL-SCNC: 104 MMOL/L (ref 95–110)
CO2 SERPL-SCNC: 29 MMOL/L (ref 22–31)
CREAT BLD-MCNC: 1.37 MG/DL (ref 0.7–1.3)
DEPRECATED RDW RBC AUTO: 38.7 FL (ref 35.1–43.9)
ERYTHROCYTE [DISTWIDTH] IN BLOOD BY AUTOMATED COUNT: 13.2 % (ref 11.5–14.5)
GFR SERPL CREATININE-BSD FRML MDRD: 65 ML/MIN/1.73 (ref 56–130)
GLOBULIN UR ELPH-MCNC: 2.8 GM/DL (ref 2.3–3.5)
GLUCOSE BLD-MCNC: 152 MG/DL (ref 60–100)
GLUCOSE BLDC GLUCOMTR-MCNC: 130 MG/DL (ref 70–130)
GLUCOSE BLDC GLUCOMTR-MCNC: 133 MG/DL (ref 70–130)
GLUCOSE BLDC GLUCOMTR-MCNC: 163 MG/DL (ref 70–130)
GLUCOSE BLDC GLUCOMTR-MCNC: 174 MG/DL (ref 70–130)
HCT VFR BLD AUTO: 32.4 % (ref 39–49)
HGB BLD-MCNC: 11.2 G/DL (ref 13.7–17.3)
LV EF 2D ECHO EST: 55 %
MCH RBC QN AUTO: 27.9 PG (ref 26.5–34)
MCHC RBC AUTO-ENTMCNC: 34.6 G/DL (ref 31.5–36.3)
MCV RBC AUTO: 80.6 FL (ref 80–98)
PLATELET # BLD AUTO: 190 10*3/MM3 (ref 150–450)
PMV BLD AUTO: 9.8 FL (ref 8–12)
POTASSIUM BLD-SCNC: 4.6 MMOL/L (ref 3.5–5.1)
PROT SERPL-MCNC: 6.2 G/DL (ref 6.3–8.6)
RBC # BLD AUTO: 4.02 10*6/MM3 (ref 4.37–5.74)
SODIUM BLD-SCNC: 140 MMOL/L (ref 137–145)
TROPONIN I SERPL-MCNC: 3.17 NG/ML
WBC NRBC COR # BLD: 6.75 10*3/MM3 (ref 3.2–9.8)

## 2017-01-22 PROCEDURE — 93307 TTE W/O DOPPLER COMPLETE: CPT | Performed by: INTERNAL MEDICINE

## 2017-01-22 PROCEDURE — 85027 COMPLETE CBC AUTOMATED: CPT | Performed by: INTERNAL MEDICINE

## 2017-01-22 PROCEDURE — 93306 TTE W/DOPPLER COMPLETE: CPT

## 2017-01-22 PROCEDURE — 80053 COMPREHEN METABOLIC PANEL: CPT | Performed by: INTERNAL MEDICINE

## 2017-01-22 PROCEDURE — 82962 GLUCOSE BLOOD TEST: CPT

## 2017-01-22 PROCEDURE — 84484 ASSAY OF TROPONIN QUANT: CPT | Performed by: INTERNAL MEDICINE

## 2017-01-22 RX ORDER — ASPIRIN 81 MG/1
81 TABLET, CHEWABLE ORAL DAILY
Qty: 30 TABLET | Refills: 1 | Status: SHIPPED | OUTPATIENT
Start: 2017-01-22

## 2017-01-22 RX ORDER — LISINOPRIL 2.5 MG/1
2.5 TABLET ORAL DAILY
Qty: 30 TABLET | Refills: 1 | Status: SHIPPED | OUTPATIENT
Start: 2017-01-22

## 2017-01-22 RX ORDER — ATORVASTATIN CALCIUM 40 MG/1
40 TABLET, FILM COATED ORAL NIGHTLY
Qty: 30 TABLET | Refills: 1 | Status: SHIPPED | OUTPATIENT
Start: 2017-01-22

## 2017-01-22 RX ORDER — ATORVASTATIN CALCIUM 40 MG/1
40 TABLET, FILM COATED ORAL NIGHTLY
Qty: 30 TABLET | Refills: 1 | Status: CANCELLED | OUTPATIENT
Start: 2017-01-22

## 2017-01-22 RX ORDER — ASPIRIN 81 MG/1
81 TABLET, CHEWABLE ORAL DAILY
Qty: 30 TABLET | Refills: 1 | Status: CANCELLED | OUTPATIENT
Start: 2017-01-22 | End: 2017-02-21

## 2017-01-22 RX ADMIN — TICAGRELOR 90 MG: 90 TABLET ORAL at 08:01

## 2017-01-22 RX ADMIN — TICAGRELOR 90 MG: 90 TABLET ORAL at 17:53

## 2017-01-22 RX ADMIN — SERTRALINE HYDROCHLORIDE 50 MG: 50 TABLET ORAL at 08:01

## 2017-01-22 RX ADMIN — ASPIRIN 81 MG 81 MG: 81 TABLET ORAL at 08:01

## 2017-01-22 RX ADMIN — METOPROLOL TARTRATE 25 MG: 25 TABLET ORAL at 08:01

## 2017-01-22 RX ADMIN — ATORVASTATIN CALCIUM 40 MG: 40 TABLET, FILM COATED ORAL at 20:44

## 2017-01-22 RX ADMIN — METOPROLOL TARTRATE 25 MG: 25 TABLET ORAL at 20:44

## 2017-01-22 NOTE — PLAN OF CARE
Problem: Cardiac Rhythm Management Device (Adult)  Goal: Signs and Symptoms of Listed Potential Problems Will be Absent or Manageable (Cardiac Rhythm Management Device)  Outcome: Ongoing (interventions implemented as appropriate)

## 2017-01-22 NOTE — PLAN OF CARE
Problem: Infection, Risk/Actual (Adult)  Goal: Identify Related Risk Factors and Signs and Symptoms  Outcome: Ongoing (interventions implemented as appropriate)

## 2017-01-22 NOTE — PLAN OF CARE
Problem: Diabetes, Type 2 (Adult)  Intervention: Optimize Glycemic Control    01/22/17 1302   Nutrition Interventions   Glycemic Management carbohydrate replacement provided

## 2017-01-22 NOTE — PROGRESS NOTES
[facility] Medicine Services  INPATIENT PROGRESS NOTE    Length of Stay: 2  Date of Admission: 1/20/2017  Primary Care Physician: No Known Provider    Subjective   Chief Complaint:  Chest pain  HPI: 56 year old presented with chest pain, found to have elevated Troponin, Patient under went cardiac catheterization and stenting on 1/20/17.   This morning patient is doing better, Mild chest pain grades it 1/10. Worse with taking in deep breath and trying to sit.  No fever or chills, No cough, No Nausea or vomiting.  No fresh complaints reported.    Review of Systems   Constitutional: Negative for chills and fever.   HENT: Negative for congestion and trouble swallowing.    Respiratory: Negative for cough and shortness of breath.    Cardiovascular: Positive for chest pain. Negative for leg swelling.        Mild reproducible chest pain   Gastrointestinal: Negative for abdominal pain, diarrhea and nausea.   Genitourinary: Negative for decreased urine volume and dysuria.   Musculoskeletal: Negative for back pain, gait problem and neck stiffness.   Neurological: Negative for syncope, speech difficulty and headaches.   Psychiatric/Behavioral: Negative for behavioral problems and confusion.      All pertinent negatives and positives are as above. All other systems have been reviewed and are negative unless otherwise stated.     Objective    Temp:  [96.8 °F (36 °C)-98.1 °F (36.7 °C)] 98.1 °F (36.7 °C)  Heart Rate:  [60-92] 92  Resp:  [18-20] 18  BP: ()/(60-70) 115/64   Body mass index is 29.65 kg/(m^2).     Physical Exam    General:  Well appearing, well nourished, in no distress. Oriented x 3.    HEENT:  Head: Normocephalic, atraumatic.  Eyes: sclera non-icteric, EOM intact, PERRL.  Ears: Hearing to normal conversation.  Mouth: Mucous membranes moist.  Pharynx: Mucosa non-inflamed, no tonsillar hypertrophy or exudate  Neck: Supple, adenopathy, non-tender    Heart: No cardiomegaly  or thrills; regular rate and rhythm, no murmur or gallop    Lungs: Clear to auscultation. Decreased breath sounds bilaterally.    Abdomen: Bowel sounds normal, no tenderness.     Extremities:  No edema, peripheral pulses intact.    Neurologic: CN 2-12 normal. No motor deficits, No obvious sensory loss, No exaggerated reflexes.    Psychiatric: Oriented X3, intact recent and remote memory, judgment and insight, normal mood  and affect.        Results Review:  I have reviewed the labs, radiology results, and diagnostic studies.    Laboratory Data:   Lab Results (last 24 hours)     Procedure Component Value Units Date/Time    CBC & Differential [[id number]]  (Abnormal) Collected:  01/20/17 1257    Specimen:  Blood Updated:  01/20/17 1305     WBC 6.4 x1000/uL      RBC 4.62 franco/mm3      Hemoglobin 13.0 (L) gm/dl      Hematocrit 36.7 (L) %      MCV 79.4 (L) fl      MCH 28.1 pg      MCHC 35.4 gm/dl      RDW 12.9 %      Platelets 209 x1000/mm3      MPV 9.4 fl      Neutrophil Rel % 56.7 %      Lymphocyte Rel % 33.4 %      Monocyte Rel % 7.7 %      Eosinophil Rel % 1.7 %      Basophil Rel % 0.3 %      Immature Granulocyte Rel % 0.20 %      Neutrophils Absolute 3.62 x1000/uL      Lymphocytes Absolute 2.13 x1000/uL      Monocytes Absolute 0.49 x1000/uL      Eosinophils Absolute 0.11 x1000/uL      Basophils Absolute 0.02 x1000/uL      Immature Granulocytes Absolute 0.010 x1000/uL      nRBC 0.0 %      nRBC 0.000 x1000/uL     Narrative:       Specimen Type : Blood    Comprehensive Metabolic Panel [[id number]]  (Abnormal) Collected:  01/20/17 1257    Specimen:  Blood Updated:  01/20/17 1314     Sodium 137 mmol/L      Potassium 4.1 mmol/L      Chloride 101 mmol/L      CO2 23 mmol/L      Anion Gap 13.0 mmol/L      Glucose 188 (H) mg/dl      BUN 14 mg/dl      Creatinine 1.3 mg/dl      GFR MDRD Non African American 57 mL/min/1.73 sq.M       Invalid if creatinine is changing or the patient is on dialysis. Use AA  result if patient is  -American, non AA result otherwise.          GFR MDRD  69 mL/min/1.73 sq.M      Calcium 9.3 mg/dl      Total Protein 6.9 gm/dl      Albumin 3.8 gm/dl      Total Bilirubin 1.3 mg/dl      Alkaline Phosphatase 55 U/L      AST (SGOT) 37 U/L      ALT (SGPT) 35 U/L     Narrative:       Specimen Type : Blood    CK [[id number]]  (Abnormal) Collected:  01/20/17 1257    Specimen:  Blood Updated:  01/20/17 1324     Creatine Kinase 196 (H) U/L     Narrative:       Specimen Type : Blood    CK-MB [[id number]]  (Abnormal) Collected:  01/20/17 1257    Specimen:  Blood Updated:  01/20/17 1332     CKMB 8.7 (H) ng/ml     Narrative:       Specimen Type : Blood    Troponin [[id number]]  (Abnormal) Collected:  01/20/17 1257    Specimen:  Blood Updated:  01/20/17 1332     Troponin I 0.161 (H) ng/ml       Rising and/or falling troponin values, in conjunction with symptoms, new  ECG changes, or new imaging abnormalities suggestive of cardiac ischemia  are consistent with acute myocardial injury. (Universal Definition of  Myocardial Infarction, Circulation. 2007; 0555-6170.)  DR MASCORRO READ BACK @ 1331         Narrative:       Specimen Type : Blood    CK [[id number]]  (Abnormal) Collected:  01/20/17 1542    Specimen:  Blood Updated:  01/20/17 1612     Creatine Kinase 209 (H) U/L     Narrative:       Specimen Type : Blood    CK-MB [[id number]]  (Abnormal) Collected:  01/20/17 1542    Specimen:  Blood Updated:  01/20/17 1635     CKMB 8.8 (H) ng/ml     Narrative:       Specimen Type : Blood    Troponin [[id number]]  (Abnormal) Collected:  01/20/17 1542    Specimen:  Blood Updated:  01/20/17 1635     Troponin I 0.160 (H) ng/ml       Rising and/or falling troponin values, in conjunction with symptoms, new  ECG changes, or new imaging abnormalities suggestive of cardiac ischemia  are consistent with acute myocardial injury. (Universal Definition of  Myocardial Infarction, Circulation. 2007; 8180-5748.)  CALLED TO RORY MARC RN  @ 7295  READ BACK         Narrative:       Specimen Type : Blood    CK [[id number]]  (Abnormal) Collected:  01/20/17 1835    Specimen:  Blood Updated:  01/20/17 1923     Creatine Kinase 256 (H) U/L     Narrative:       Specimen Type : Blood    CK-MB [[id number]]  (Abnormal) Collected:  01/20/17 1835    Specimen:  Blood Updated:  01/20/17 1935     CKMB 14.5 (H) ng/ml     Narrative:       Specimen Type : Blood    Troponin [[id number]]  (Abnormal) Collected:  01/20/17 1835    Specimen:  Blood Updated:  01/20/17 1935     Troponin I 0.150 (H) ng/ml       CALLED TO ARISTEO OTT RN @ 1935 READ BACK  Rising and/or falling troponin values, in conjunction with symptoms, new  ECG changes, or new imaging abnormalities suggestive of cardiac ischemia  are consistent with acute myocardial injury. (Universal Definition of  Myocardial Infarction, Circulation. 2007; 0123-4697.)         Narrative:       Specimen Type : Blood    Hemoglobin A1c [[id number]]  (Abnormal) Collected:  01/20/17 1257    Specimen:  Blood Updated:  01/20/17 2228     Hemoglobin A1C 6.6 (H) %TotHgb     Narrative:       Specimen Type : WHOLE BLOOD    CBC & Differential [[id number]] Collected:  01/21/17 0413    Specimen:  Blood Updated:  01/21/17 0443    Narrative:       The following orders were created for panel order CBC & Differential.  Procedure                               Abnormality         Status                     ---------                               -----------         ------                     CBC Auto Differential[[id number]]         Abnormal            Final result                 Please view results for these tests on the individual orders.    CBC Auto Differential [[id number]]  (Abnormal) Collected:  01/21/17 0413    Specimen:  Blood Updated:  01/21/17 0443     WBC 6.73 10*3/mm3      RBC 4.16 (L) 10*6/mm3      Hemoglobin 11.6 (L) g/dL      Hematocrit 32.9 (L) %      MCV 79.1 (L) fL      MCH 27.9 pg      MCHC 35.3 g/dL      RDW 13.2 %      RDW-SD 37.9 fl      MPV 9.5  fL      Platelets 200 10*3/mm3      Neutrophil % 72.1 %      Lymphocyte % 20.1 %      Monocyte % 7.4 %      Eosinophil % 0.1 %      Basophil % 0.0 %      Immature Grans % 0.3 %      Neutrophils, Absolute 4.85 10*3/mm3      Lymphocytes, Absolute 1.35 10*3/mm3      Monocytes, Absolute 0.50 10*3/mm3      Eosinophils, Absolute 0.01 10*3/mm3      Basophils, Absolute 0.00 10*3/mm3      Immature Grans, Absolute 0.02 10*3/mm3     Basic Metabolic Panel [[id number]]  (Abnormal) Collected:  01/21/17 0413    Specimen:  Blood Updated:  01/21/17 0555     Glucose 162 (H) mg/dL      BUN 13 mg/dL      Creatinine 1.16 mg/dL      Sodium 137 mmol/L      Potassium 4.1 mmol/L      Chloride 102 mmol/L      CO2 24.0 mmol/L      Calcium 9.0 mg/dL      eGFR  African Amer 78 mL/min/1.73      BUN/Creatinine Ratio 11.2      Anion Gap 11.0 mmol/L     Lipid Panel [[id number]]  (Abnormal) Collected:  01/21/17 0413    Specimen:  Blood Updated:  01/21/17 0606     Total Cholesterol 210 (H) mg/dL      Triglycerides 201 (H) mg/dL      HDL Cholesterol 52 (L) mg/dL      LDL Cholesterol  117 mg/dL      LDL/HDL Ratio 2.27     Troponin [[id number]]  (Abnormal) Collected:  01/21/17 0413    Specimen:  Blood Updated:  01/21/17 0614     Troponin I 1.630 (C) ng/mL           Culture Data:   No results found for: BLOODCX  No results found for: URINECX  No results found for: RESPCX  No results found for: WOUNDCX  No results found for: STOOLCX  No components found for: BODYFLD    Radiology Data:   Imaging Results (last 24 hours)     Procedure Component Value Units Date/Time    XR Chest 1 View [[id number]] Collected:  01/20/17 1246     Updated:  01/20/17 1248    Narrative:       Patient Name-  JAX WANG  Patient ID-  [id number]   Ordering-  FAUSTINO MASCORRO MD  Attending-  DR TEMP  Referring-       ------------------------------------------------  Chest single view  CLINICAL INDICATION- Shortness of breath. Chest pain     COMPARISON- Chest November 18, 2015.     FINDINGS-  Cardiac silhouette is normal in size. Pulmonary vascularity  is unremarkable.      No focal infiltrate or consolidation.  No pleural effusion.  No  pneumothorax.     CONCLUSION- No evidence of active disease.     Electronically Signed By- Will Rodrigues MD On: 2017-01-20 12:46:24          I have reviewed the patient current medications.       Current Facility-Administered Medications:   •  aspirin chewable tablet 81 mg, 81 mg, Oral, Daily, John Omalley MD, 81 mg at 01/22/17 0801  •  atorvastatin (LIPITOR) tablet 40 mg, 40 mg, Oral, Nightly, John Omalley MD, 40 mg at 01/21/17 2105  •  dextrose (D50W) solution 25 g, 25 g, Intravenous, Q15 Min PRN, Stephen Corey MD  •  dextrose (GLUTOSE) oral gel 15 g, 15 g, Oral, Q15 Min PRN, Stephen Corey MD  •  glucagon (human recombinant) (GLUCAGEN DIAGNOSTIC) injection 1 mg, 1 mg, Subcutaneous, Q15 Min PRN, Stephen Corey MD  •  HYDROcodone-acetaminophen (NORCO) 7.5-325 MG per tablet 1 tablet, 1 tablet, Oral, Q6H PRN, John Omalley MD, 1 tablet at 01/21/17 1759  •  insulin aspart (novoLOG) injection 0-9 Units, 0-9 Units, Subcutaneous, 4x Daily AC & at Bedtime, Stephen Corey MD, 2 Units at 01/21/17 2049  •  metoprolol tartrate (LOPRESSOR) tablet 25 mg, 25 mg, Oral, Q12H, John Omalley MD, 25 mg at 01/22/17 0801  •  ondansetron (ZOFRAN) injection 4 mg, 4 mg, Intravenous, Q6H PRN, John Omalley MD  •  senna-docusate (PERICOLACE) 8.6-50 MG per tablet 1 tablet, 1 tablet, Oral, Daily PRN, John Omalley MD  •  sertraline (ZOLOFT) tablet 50 mg, 50 mg, Oral, Daily, Stephen Corey MD, 50 mg at 01/22/17 0801  •  sodium chloride 0.9 % flush 3 mL, 3 mL, Intravenous, PRN, John Omalley MD, 3 mL at 01/21/17 0819  •  temazepam (RESTORIL) capsule 15 mg, 15 mg, Oral, Nightly PRN, John Omalley MD, 15 mg at 01/21/17 2049  •  ticagrelor (BRILINTA) tablet 90 mg, 90 mg, Oral, BID, John Omalley MD, 90 mg at 01/22/17 0801            Dietary Orders            Start      Ordered    01/21/17 1129  Diet Regular; Consistent Carbohydrate  Diet Effective Now     Question Answer Comment   Diet Texture / Consistency Regular    Common Modifiers Consistent Carbohydrate        01/21/17 1128          Assessment/Plan     Hospital Problem List     1. Non Q wave myocardial infarction-   S/P Cardiac craterization and stenting, Now on Aspirin and Brilinta, Also on Lipitor and Metoprolol, Not on ACEI due to low blood pressure and chronic kidney disease.         2. Essential hypertension-   On Metoprolol,  Blood pressure fairly under good control    3. New diagnosis DM- A1c 6.6.  Currently on ADA diet and on insulin sliding scale.  Plan to stat on metformin before discharge.    4. Depression   Continue with zoloft    5. DVT prophylaxis- encourage ambulation, PADUA score < 4.      Awaiting echocardiogram report prior to discharge.                 Stephen Corey MD   01/22/17   2:54 PM

## 2017-01-22 NOTE — CONSULTS
"Adult Nutrition  Assessment    Patient Name:  Eitan Richards  YOB: 1960  MRN: [id number]  Admit Date:  1/20/2017    Assessment Date:  1/22/2017          Reason for Assessment       01/22/17 1251    Reason for Assessment    Reason For Assessment/Visit education    Identified At Risk By Screening Criteria need for education    Time Spent (min) 20                    Labs/Tests/Procedures/Meds       01/22/17 1252    Labs/Tests/Procedures/Meds    Diagnostic Test/Procedure Review reviewed    Labs/Tests Review Glucose;Hgb Hct    Significant Vitals reviewed              Estimated/Assessed Needs       01/22/17 1253    Calculation Measurements    Weight Used For Calculations 96.6 kg (213 lb)    Height Used for Calculations 1.803 m (5' 11\")    Estimated/Assessed Energy Needs    Energy Need Method [place]-St Jeor    Age 56    RMR ([place]-St. Jeor Equation) 1808.28    Activity Factors ([place] St Jeor)  Out of bed, ambulatory  1.3    Estimated/Assessed Protein Needs    Weight Used for Protein Calculation 96.6 kg (213 lb)    Protein (gm/kg) 0.8    0.8 Gm Protein (gm) 77.29                Comments:  Pt voices he is highly motivated to follow a diet to prevent further problems with his health after a heart attack. His wife will be in later and she wants to talk to RDN-she has questions about the patient's diet for home. Pt agreed to the following goals discussed with RDN-1) eat regular meals( do not skip meals), drink no calorie beverages, 3) limit fruit to 1 serving with each meal, 4 eat vegetables with lunch and supper. Education to continue.        Electronically signed by:  Anabelle Baca RD  01/22/17 12:56 PM  "

## 2017-01-22 NOTE — PLAN OF CARE
Problem: Diabetes, Type 2 (Adult)  Goal: Signs and Symptoms of Listed Potential Problems Will be Absent or Manageable (Diabetes, Type 2)  Outcome: Ongoing (interventions implemented as appropriate)

## 2017-01-22 NOTE — PLAN OF CARE
Problem: Infection, Risk/Actual (Adult)  Goal: Infection Prevention/Resolution  Outcome: Ongoing (interventions implemented as appropriate)

## 2017-01-22 NOTE — PLAN OF CARE
Problem: Patient Care Overview (Adult)  Goal: Plan of Care Review  Outcome: Ongoing (interventions implemented as appropriate)  Goal: Adult Individualization and Mutuality  Outcome: Ongoing (interventions implemented as appropriate)  Goal: Discharge Needs Assessment  Outcome: Ongoing (interventions implemented as appropriate)    Problem: Infection, Risk/Actual (Adult)  Goal: Identify Related Risk Factors and Signs and Symptoms  Outcome: Ongoing (interventions implemented as appropriate)  Goal: Infection Prevention/Resolution  Outcome: Ongoing (interventions implemented as appropriate)    Problem: Pain, Acute (Adult)  Goal: Identify Related Risk Factors and Signs and Symptoms  Outcome: Ongoing (interventions implemented as appropriate)  Goal: Acceptable Pain Control/Comfort Level  Outcome: Ongoing (interventions implemented as appropriate)    Problem: Cardiac Rhythm Management Device (Adult)  Goal: Signs and Symptoms of Listed Potential Problems Will be Absent or Manageable (Cardiac Rhythm Management Device)  Outcome: Ongoing (interventions implemented as appropriate)    Problem: Diabetes, Type 2 (Adult)  Goal: Signs and Symptoms of Listed Potential Problems Will be Absent or Manageable (Diabetes, Type 2)  Outcome: Ongoing (interventions implemented as appropriate)

## 2017-01-23 NOTE — DISCHARGE INSTRUCTIONS
The Diet- Heart healthy,  diabetic diet.  Activity- as tolerated  Medications- see the medication reconciliation completed the time of discharge.  Patient was asked to call or return if the temperatures greater than 100.4, if there is worsening nausea, vomiting, chest pain, shortness of breath, abdominal pain or any other concerns.

## 2017-01-23 NOTE — DISCHARGE SUMMARY
[facility] Medicine Services  DISCHARGE SUMMARY       Date of Admission: 1/20/2017  Date of Discharge:  1/22/2017  Primary Care Physician: No Known Provider    Presenting Problem/History of Present Illness:  The patient is a 56-year-old  male who presented with a history of recurrent intermittent chest pain.  Evaluation in the ER showed the patient had elevated troponin., He was diagnosed with non-ST elevation MI.      Final Discharge Diagnoses:  1. Non Q wave myocardial infarction-   S/P Cardiac craterization and stenting, Now on Aspirin and Brilinta, Also on Lipitor and Metoprolol, and we'll started on low-dose lisinopril .  Follow-up with cardiologist in 3 weeks       2. Essential hypertension-   On Metoprolol and lisinopril     3. New diagnosis DM- A1c 6.6.  Managed with ADA diet and with insulin sliding scale.  Patient will be discharged home on metformin 500 mg by mouth twice a day.     4. Depression   Continue with zoloft     5. DVT prophylaxis- encourage ambulation, PADUA score < 4.         Consults:   Cardiologist- Dr. Ajith Peraza- details please see his consult note and progress note    Procedures Performed:   Cardiac catheterization with stenting on 1/20/2017                Pertinent Test Results:   Echocardiogram done on 1/22/2017  Impression:    · Left ventricular wall thickness is consistent with mild concentric hypertrophy.  · Left ventricular wall segments contract abnormally. Refer to wall scoring diagram for more information.  · Left ventricular diastolic dysfunction (grade I a) consistent with impaired relaxation.  · Estimated EF appears to be in the range of 56 - 60%.      Chief Complaint on Day of Discharge:  None    Hospital Course:  The patient is a 56 y.o. male who presented with complaint of chest pain,evaluation donerevealed non-ST elevation MI.  Patient was seen by the cardiologist. He recommended cardiac catheterization. On  "1/20/2017 underwent PTCA with stenting.  Operative and perioperative period was uneventful patient's chest pain had significantly improved.  Patient was started on Aspirin, Brilinta, along with Lipitor, metoprolol and lisinopril.    Condition on Discharge: stable    Physical Exam on Discharge:  Visit Vitals   • /75 (BP Location: Left arm)   • Pulse 67   • Temp 98.1 °F (36.7 °C) (Oral)   • Resp 18   • Ht 71\" (180.3 cm)   • Wt 212 lb 9.6 oz (96.4 kg)   • SpO2 96%   • BMI 29.65 kg/m2     Physical Exam   Constitutional: He is oriented to person, place, and time. He appears well-developed and well-nourished.   HENT:   Head: Normocephalic and atraumatic.   Eyes: EOM are normal. Pupils are equal, round, and reactive to light.   Cardiovascular: Normal rate, regular rhythm and intact distal pulses.    Pulmonary/Chest: Effort normal and breath sounds normal.   Abdominal: Soft. Bowel sounds are normal.   Musculoskeletal: Normal range of motion.   Neurological: He is alert and oriented to person, place, and time. He has normal reflexes.   Skin: Skin is warm.   Psychiatric: He has a normal mood and affect. His behavior is normal. Judgment and thought content normal.   Vitals reviewed.    Discharge Disposition:  Home or Self Care    Discharge Medications:   Eitan Richards   Home Medication Instructions DAYNE:[id number]    Printed on:01/22/17 1931   Medication Information                      aspirin 81 MG chewable tablet  Chew 1 tablet Daily.             atorvastatin (LIPITOR) 40 MG tablet  Take 1 tablet by mouth Every Night.             HYDROcodone-acetaminophen (NORCO)  MG per tablet  Take 1 tablet by mouth 2 (Two) Times a Day As Needed for moderate pain (4-6).             lisinopril (ZESTRIL) 2.5 MG tablet  Take 1 tablet by mouth Daily.             metFORMIN (GLUCOPHAGE) 500 MG tablet  Take 1 tablet by mouth 2 (Two) Times a Day With Meals.             metoprolol tartrate (LOPRESSOR) 25 MG tablet  Take 1 tablet by " mouth Every 12 (Twelve) Hours.             sertraline (ZOLOFT) 50 MG tablet  Take 50 mg by mouth Daily.             ticagrelor (BRILINTA) 90 MG tablet tablet  Take 1 tablet by mouth 2 (Two) Times a Day.             zolpidem (AMBIEN) 10 MG tablet  Take 10 mg by mouth At Night As Needed for sleep.                 Discharge Diet:   Diet Instructions     Cardiac Diabetic Diet- information given               Activity at Discharge:  Ad balwinder.    Discharge Care Plan/Instructions: Patient was asked to call or return if the temperatures greater than 100.4, if there is worsening nausea, vomiting, chest pain, shortness of breath, abdominal pain or any other concerns.    Follow-up Appointments:    Follow-up Information      Follow up with No Known Provider Follow up in 2 day(s).     Contact information:     [place] [id number]             Follow up with Ajith Lutz MD. Schedule an appointment as soon as possible for a visit in 3 week(s).     Specialty: Cardiology     Why: For NSTEMI     Contact information:     [address], [address]   [place] [id number] [phone]             Follow up with Clementine Zelaya MD. Schedule an appointment as soon as possible for a visit in 1 week(s).     Specialty: Nephrology     Why: For CKD     Contact information:     [address]   [place] [id number] [phone]          Test Results Pending at Discharge: None    Stephen Corey MD  01/22/17  7:31 PM    Time spent: 40 mins

## 2017-01-23 NOTE — PLAN OF CARE
Problem: Patient Care Overview (Adult)  Goal: Plan of Care Review  Outcome: Outcome(s) achieved Date Met:  01/22/17  Goal: Adult Individualization and Mutuality  Outcome: Outcome(s) achieved Date Met:  01/22/17  Goal: Discharge Needs Assessment  Outcome: Outcome(s) achieved Date Met:  01/22/17    Problem: Infection, Risk/Actual (Adult)  Goal: Identify Related Risk Factors and Signs and Symptoms  Outcome: Outcome(s) achieved Date Met:  01/22/17  Goal: Infection Prevention/Resolution  Outcome: Outcome(s) achieved Date Met:  01/22/17    Problem: Pain, Acute (Adult)  Goal: Identify Related Risk Factors and Signs and Symptoms  Outcome: Outcome(s) achieved Date Met:  01/22/17  Goal: Acceptable Pain Control/Comfort Level  Outcome: Outcome(s) achieved Date Met:  01/22/17    Problem: Cardiac Rhythm Management Device (Adult)  Goal: Signs and Symptoms of Listed Potential Problems Will be Absent or Manageable (Cardiac Rhythm Management Device)  Outcome: Outcome(s) achieved Date Met:  01/22/17    Problem: Diabetes, Type 2 (Adult)  Goal: Signs and Symptoms of Listed Potential Problems Will be Absent or Manageable (Diabetes, Type 2)  Outcome: Outcome(s) achieved Date Met:  01/22/17

## 2017-01-24 NOTE — CONSULTS
[facility]                                CONSULTATION     NAME:  JAX WANG            UNIT #:  [id number]  :  1960              ACCT #:  [id number]  ROOM:  900 2                  TO DOCTOR:                                ANIYA ONEAL MD  DICTATED:  2017         TRANSCRIBED:  1627                          2017 1631        DATE OF CONSULT:  2017     REQUESTING SOURCE:  ANIYA ONEAL MD     REASON FOR CONSULTATION:  Non-ST elevation MI.     HISTORY OF PRESENT ILLNESS:  This is a 56-year-old gentleman who  presented with complaining of burning sensation of the chest radiating  to the arm with diaphoresis and shortness of breath. He used to chew  tobacco, he had quit about 6 months ago. He is borderline diabetic, not  taking any medications. He also has a history of hypogonadism. He says  he has elevated cholesterol but does not take any medications. [facility] is  [facility] [place].     CONCURRENT MEDICAL HISTORY:  1.   Borderline diabetes.  2.   Hypogonadism.  3.   Chronic pain.     PAST SURGICAL HISTORY:  Appendectomy.     MEDICATIONS:  Zoloft.     SOCIAL HISTORY:  Patient quit chewing about 6 months ago.     FAMILY HISTORY:  Positive for CAD in his mom and dad.     REVIEW OF SYSTEMS:     HEENT:  No headache or visual complaints. No hearing impairment. No  nasal discharge.     RESPIRATORY:  Has shortness of breath. No orthopnea.     CARDIOVASCULAR:  As mentioned above.     GI:  No history of nausea, vomiting or melena.     :  No dysuria or hematuria.     CNS:  No history of seizure or stroke.     PSYCHIATRIC:  History of depression.     PHYSICAL EXAMINATION:  Heart rate 60, blood pressure 124/68.     HEENT:  Within normal limits.     NECK:  Supple. No JVD, no bruit.     CHEST:  Air entry is equal. No wheezing.     CARDIOVASCULAR:  Regular rate and rhythm. No S3 or S4.     ABDOMEN:  Soft, no tenderness.     EXTREMITIES:  The  pulses are felt equally on both sides. There is no  pedal edema.     CNS:  Patient is alert and oriented. No motor or sensory deficits.     DIAGNOSTIC DATA:  EKG showed sinus rhythm with nonspecific ST-T changes.  CBC, hemoglobin 13, hematocrit 36.7, platelet count 209,000, white count  6,400. Troponin is 0.161. Glucose is 188. LFTs are normal.     ASSESSMENT AND RECOMMENDATION:  1.   Non-ST elevation MI, still having chest pain in spite of being on       Nitro drip and heparin. Risk factors are history of tobacco abuse,       borderline diabetes. Plan for cardiac catheterization. Explained       the risks of bleeding, infection, stroke, kidney failure and heart       attack. Patient consented. Patient is ASA class 2, Mallampati level       II. Patient consented for conscious sedation.  2.   Elevated sugars. Will get a hemoglobin A1c.  3.   Will start statins.        GENIA TRINIDAD MD  Electronically Signed  01/24/2017 11:49:19  By GENIA TRINIDAD MD     GV/ashlee  [id number]  cc:   GENIA TRINIDAD MD                                CONSULTATION  Page #page

## 2017-01-24 NOTE — OP NOTE
[facility]                             CARDIAC CATHETERIZATION     NAME:  JAX WANG            UNIT #:  [id number]  :  1960              PHYSICIAN:  GENIA TRINIDAD MD  ROOM:  900 2                  [facility] #:  [id number]  DICTATED:  2017   TRANSCRIBED:  2017        DATE OF PROCEDURE:  2017     REASON FOR PROCEDURE:  Non-ST elevation MI with active chest pain.DM,  HTN     PROCEDURES PERFORMED:  1.   Left heart catheterization.  2.   Selective coronary angiogram.  3.   Percutaneous coronary intervention with stent placement of the       distal right coronary artery.     DESCRIPTION OF PROCEDURE:  The patient was brought to the  catheterization lab from the emergency room as he was still having chest  pain. After obtaining informed consent, right groin was anesthetized  using 2% Lidocaine. Subsequently, a 6-Bermudian short sheath was inserted  using modified Seldinger technique. Left coronary angiogram was obtained  using a 6-Bermudian left Lj catheter, right coronary angiogram using  right Lj catheter. No LV-gram was done.     HEMODYNAMICS:  Aortic pressure: 115/73.     CORONARIES:  1.   Left main: Large caliber vessel  which is free of significant       disease.  2.   LAD: A large caliber vessel, goes all the way to the apex. Mid and       distal were okay distally. Almost at the apex, there was a 60% to       70% stenosis. There were multiple diagonals which were free of       significant disease.  3.   Left Circumflex: Gives rise to multiple Oms, which again were free       of significant disease.  4.   Right coronary artery: Proximally gives rise to the RV branch. At       the distal portion, there was 100% occlusion just before the       bifurcation to PDA and PLV.     Subsequently after giving heparin bolus, a BMW wire was initially  crossed into the PLV branch, but it was not crossing into the PDA.  Subsequently, a  50 wire  was used to cross to the PDA and was  ballooned with a 2.0 x 15 Trek balloon. Subsequently, it was changed to  a BMW wire into the PDA and it was also standard ballooned from distal  right to the PLV also. Subsequently, 2.5 x 28 Xience Alpine stent was  stented from distal RCA to the PDA branch, but in spite of that, was  only getting OLIMPIA-2 flow. He was given intracoronary nitroglycerin,  intracoronary adenosine. Still, there was only OLIMPIA-2 flow, but the flow  was slowly improving. Subsequently, the patient was given a bolus of  Integrilin  and drip. The patient will be continued on nitroglycerin  drip, Brilinta, aspirin, and beta blockers.     CONCLUSIONS:  Successful PCI to the distal RCA and PDA, reducing 100%  stenosis to 0%, but obtaining only OLIMPIA-2 flow from OILMPIA-0 flow.        GENIA TRINIDAD MD  Electronically Signed  01/24/2017 12:00:25  By GENIA TRINIDAD MD     /leesa  [id number]  cc:   GENIA TRINIDAD MD                          CARDIAC CATHETERIZATION  Page #page

## 2017-01-30 NOTE — H&P
[facility]                               HISTORY AND PHYSICAL     NAME:  JAX WANG            UNIT #:  [id number]  :  1960              ACCT #:  [id number]  ROOM:  900 2                  ADMITTED:  2017                                PHYSICIAN:  JUAN CARLOS WEBBER MD  DICTATED:  2017   TRANSCRIBED:  2017  0715        CHIEF COMPLAINT:  Chest pain.     HISTORY OF PRESENT ILLNESS:  The patient is a 56-year-old   American male who presented with a history of recurrent intermittent  chest pain. He states it has been going on for quite some time. Really  could not tell me exactly how long it has been going on for, but it has  been intermittent. He states his chest hurts worse and he has been  seeing Pulmonology because it seems that the thought has been that this  is lung related. On day prior to admission, he developed fairly  significant chest pain. His wife gave him what sounds like pills for  acid reflux which seemed to help, and then on the morning of admission  he developed more chest pain that did not go away with the initial  treatment that he had previously tried. He developed per his report a  sharp burning type pain associated with dizziness and diaphoresis. He  developed, he stated, significant weakness, headache and some  generalized numbness. He states that he was able to call was wife and  she came home and called 911, and they then presented to the emergency  department. There were not any real alleviating factors.     CONCURRENT MEDICAL HISTORY:  1.   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  2.   Diabetes.  3.   Depression.  4.   Sleep apnea.     HOME MEDICATIONS:  1.   Lortab 7.5 as needed.  2.   Transdermal testosterone.     ALLERGIES:  No known drug allergies.     SOCIAL HISTORY:  No alcohol, no illicit drug use, no tobacco use in the  last 6 months.     FAMILY HISTORY:  No coronary artery disease.     REVIEW  OF SYSTEMS:  Positive for chest pain with associated symptoms as  above. Negative for neurological. All other systems are reviewed  negative.     PHYSICAL EXAMINATION:     VITAL SIGNS:  Stable. He is afebrile.     GENERAL:  This is a well-developed, well-nourished,   male who is in no acute distress.     HEENT:  Normocephalic and atraumatic. His pupils are equal, round, and  reactive to light and accommodation. Extraocular motions are intact. His  mucous membranes are moist.     NECK:  Supple. His trachea is midline. There is no JVD. No  lymphadenopathy.     LUNGS:  Clear to auscultation bilaterally. No wheezes, rhonchi or rales.     HEART:  S1, S2 with no murmurs, gallops, or rubs.     ABDOMEN:  Positive bowel sounds. Soft, nontender, and nondistended.     EXTREMITIES:  Positive pulses. No clubbing, cyanosis, or edema.     NEUROLOGICAL:  Cranial nerves II-XII are grossly intact. Motor and  sensory grossly intact.     DIAGNOSTIC DATA:  Laboratory data - Comprehensive metabolic panel  significant only for glucose of 188. Initial troponin 0.1611. White  blood cell count 6.4, hemoglobin 13, platelet count 209,000.     Radiography - Chest x-ray shows no active disease.     ASSESSMENT:  1.   Non ST-elevation myocardial infarction. He has been taken to the       cath lab by Dr. Lutz. He underwent a balloon angioplasty of       the right coronary artery with resultant OLIMPIA 2 flow.  2.   Hyperglycemia.  3.   Depression.  4.   Obstructive sleep apnea.     PLAN:  1.   Start on anti-platelet agents, statins, and beta-blockers.  2.   Treat his diabetes.  3.   Dr. Lutz has already seen the patient and catheterization has       been performed.  4.   Further plan as per hospital course.        JUAN CARLOS WEBBER MD  Electronically Signed  01/30/2017 11:44:34  By JUAN CARLOS WEBBER MD     KLW/saad  [id number]                            HISTORY AND PHYSICAL  Page #page

## 2017-02-28 ENCOUNTER — TRANSCRIBE ORDERS (OUTPATIENT)
Dept: CARDIAC REHAB | Facility: HOSPITAL | Age: 57
End: 2017-02-28

## 2017-02-28 DIAGNOSIS — Z98.61 POST PTCA: Primary | ICD-10-CM

## 2017-03-13 ENCOUNTER — HOSPITAL ENCOUNTER (OUTPATIENT)
Dept: CARDIAC REHAB | Facility: HOSPITAL | Age: 57
Setting detail: THERAPIES SERIES
Discharge: HOME | End: 2017-03-13

## 2017-03-13 ENCOUNTER — HOSPITAL ENCOUNTER (OUTPATIENT)
Dept: PULMONOLOGY | Facility: HOSPITAL | Age: 57
Discharge: HOME | End: 2017-03-13
Admitting: INTERNAL MEDICINE

## 2017-03-13 VITALS
SYSTOLIC BLOOD PRESSURE: 131 MMHG | BODY MASS INDEX: 29.15 KG/M2 | DIASTOLIC BLOOD PRESSURE: 80 MMHG | HEART RATE: 82 BPM | WEIGHT: 209 LBS

## 2017-03-13 DIAGNOSIS — Z98.61 POST PTCA: Primary | ICD-10-CM

## 2017-03-13 PROCEDURE — 94761 N-INVAS EAR/PLS OXIMETRY MLT: CPT

## 2017-03-13 NOTE — PROGRESS NOTES
"Cardiac Rehab Initial Assessment      Name: Eitan Richards  :1960 Allergies:Review of patient's allergies indicates no known allergies.   MRN: [id number] 56 y.o. Physician: No Known Provider   Primary Diagnosis:AMI  PTCA/STENT 2017 Event Date: 2017 [facility]ist[facility]: MICKIE Newman    Risk Stratification:Moderate Risk Note Author: Niki Rodriguez RN     Cardiovascular History: none     EXERCISE AT HOME  no            Ambulatory Status:Independent  Ambulatory Fall Risk Assessed on Initial Visit: yes Score 20 6 Minute Walk Pre- Cardiac Rehab:  Distance:1660ft      RPE:6  Max. HR: 115       SPO2:95     MPH: 3.1               Resting BP: 131/80 LA, 125/93   RA    Peak BP: 125/90    Recovery BP: 140/90        NUTRITION  Lipids:yes If yes, labs as follows;  Total:230  HDL: 58  HDL CHOLESTEROL   Date Value Ref Range Status   2017 52 (L) 60 - 200 mg/dL Final    Lipids continued:  LDL: 145  Triglyceride: 133   Weight Management:                 Weight: 209  Height: 5'11\"                                  BMI   Alcohol Use: none Diabetes:Yes,  Monitors BS at home- yes, Frequency: 1 time daily, Random BS: 108    Last HGBA1C with date if applicable: 5.6         SOCIAL HISTORY  Social History     Social History   • Marital status:      Spouse name: N/A   • Number of children: N/A   • Years of education: N/A     Social History Main Topics   • Smoking status: Not on file   • Smokeless tobacco: Not on file   • Alcohol use Not on file   • Drug use: Not on file   • Sexual activity: Not on file     Other Topics Concern   • Not on file     Social History Narrative      Learning Barriers:Ready to Learn    Family Support:yes    Living Arrangement: lives with their spouse    Risk Factors: Clinical Depression  Yes  HTN, CHOLESTEROL, DM   Tobacco Adjunct: No             PSYCHOSOCIAL  Clinical Depression: yes    Stress: yes     Assess presence or absence of depression using a valid " screening tool: yes  PHQ9 22 Has PTSD and has dealt with depression x 15years. On medication and sees therapist every 2 weeks.       PHYSICAL ASSESSMENT  WNL          Angina: no          Today are you having any other pain? Yes. If, Yes, Scale: 4  Left shoulder  States surgery in past and this is his usual pain with pain meds Diagnosed with Hypertension:yes  Does not take meds ordered. States  no need  Heart Sounds: WNL     Lung Sounds: normal air entry, lungs clear to auscultation         Assessment: WNL Orthopedic Problems: left shoulder pain previous surgery        Are you being neglected by a caregiver? N/A        Assessment: WNL    Family attends IA: no Time of arrival: 0915  Time of departure: 1030     Patient Goals: increase stength/stamina  Progress back to his normal ADLS and diet control         3/13/2017  10:31 AM  Niki Rodriguez RN

## 2017-04-05 ENCOUNTER — HOSPITAL ENCOUNTER (OUTPATIENT)
Dept: CARDIAC REHAB | Facility: HOSPITAL | Age: 57
Discharge: HOME | End: 2017-04-05
Admitting: INTERNAL MEDICINE

## 2017-04-05 VITALS
DIASTOLIC BLOOD PRESSURE: 90 MMHG | SYSTOLIC BLOOD PRESSURE: 128 MMHG | WEIGHT: 209.5 LBS | HEART RATE: 109 BPM | BODY MASS INDEX: 29.22 KG/M2

## 2017-04-05 DIAGNOSIS — Z98.61 POST PTCA: Primary | ICD-10-CM

## 2017-04-05 PROCEDURE — 93798 PHYS/QHP OP CAR RHAB W/ECG: CPT

## 2017-04-07 ENCOUNTER — HOSPITAL ENCOUNTER (OUTPATIENT)
Dept: CARDIAC REHAB | Facility: HOSPITAL | Age: 57
Discharge: HOME | End: 2017-04-07
Admitting: INTERNAL MEDICINE

## 2017-04-07 VITALS — DIASTOLIC BLOOD PRESSURE: 76 MMHG | SYSTOLIC BLOOD PRESSURE: 134 MMHG | HEART RATE: 97 BPM

## 2017-04-07 DIAGNOSIS — Z98.61 POST PTCA: Primary | ICD-10-CM

## 2017-04-07 PROCEDURE — 93798 PHYS/QHP OP CAR RHAB W/ECG: CPT

## 2017-04-10 ENCOUNTER — HOSPITAL ENCOUNTER (OUTPATIENT)
Dept: CARDIAC REHAB | Facility: HOSPITAL | Age: 57
Discharge: HOME | End: 2017-04-10
Admitting: INTERNAL MEDICINE

## 2017-04-10 VITALS
HEART RATE: 109 BPM | DIASTOLIC BLOOD PRESSURE: 79 MMHG | WEIGHT: 208 LBS | BODY MASS INDEX: 29.01 KG/M2 | SYSTOLIC BLOOD PRESSURE: 137 MMHG

## 2017-04-10 DIAGNOSIS — Z98.61 POST PTCA: Primary | ICD-10-CM

## 2017-04-10 PROCEDURE — 93798 PHYS/QHP OP CAR RHAB W/ECG: CPT

## 2017-04-14 ENCOUNTER — HOSPITAL ENCOUNTER (OUTPATIENT)
Dept: CARDIAC REHAB | Facility: HOSPITAL | Age: 57
Discharge: HOME | End: 2017-04-14
Admitting: INTERNAL MEDICINE

## 2017-04-14 VITALS — SYSTOLIC BLOOD PRESSURE: 127 MMHG | HEART RATE: 92 BPM | DIASTOLIC BLOOD PRESSURE: 82 MMHG

## 2017-04-14 DIAGNOSIS — Z98.61 POST PTCA: Primary | ICD-10-CM

## 2017-04-14 PROCEDURE — 93798 PHYS/QHP OP CAR RHAB W/ECG: CPT

## 2017-04-17 ENCOUNTER — HOSPITAL ENCOUNTER (OUTPATIENT)
Dept: CARDIAC REHAB | Facility: HOSPITAL | Age: 57
Discharge: HOME | End: 2017-04-17
Admitting: INTERNAL MEDICINE

## 2017-04-17 VITALS
WEIGHT: 208 LBS | BODY MASS INDEX: 29.01 KG/M2 | DIASTOLIC BLOOD PRESSURE: 89 MMHG | SYSTOLIC BLOOD PRESSURE: 144 MMHG | HEART RATE: 84 BPM

## 2017-04-17 DIAGNOSIS — Z98.61 POST PTCA: Primary | ICD-10-CM

## 2017-04-17 PROCEDURE — 93798 PHYS/QHP OP CAR RHAB W/ECG: CPT

## 2017-04-24 ENCOUNTER — HOSPITAL ENCOUNTER (OUTPATIENT)
Dept: CARDIAC REHAB | Facility: HOSPITAL | Age: 57
Discharge: HOME | End: 2017-04-24
Admitting: INTERNAL MEDICINE

## 2017-04-24 VITALS
SYSTOLIC BLOOD PRESSURE: 138 MMHG | BODY MASS INDEX: 28.87 KG/M2 | WEIGHT: 207 LBS | HEART RATE: 82 BPM | DIASTOLIC BLOOD PRESSURE: 83 MMHG

## 2017-04-24 DIAGNOSIS — Z98.61 POST PTCA: Primary | ICD-10-CM

## 2017-04-24 PROCEDURE — 93798 PHYS/QHP OP CAR RHAB W/ECG: CPT

## 2017-04-26 ENCOUNTER — HOSPITAL ENCOUNTER (OUTPATIENT)
Dept: CARDIAC REHAB | Facility: HOSPITAL | Age: 57
Discharge: HOME | End: 2017-04-26
Admitting: INTERNAL MEDICINE

## 2017-04-26 VITALS — HEART RATE: 100 BPM | DIASTOLIC BLOOD PRESSURE: 87 MMHG | SYSTOLIC BLOOD PRESSURE: 148 MMHG

## 2017-04-26 DIAGNOSIS — Z98.61 POST PTCA: Primary | ICD-10-CM

## 2017-04-26 PROCEDURE — 93798 PHYS/QHP OP CAR RHAB W/ECG: CPT

## 2017-04-28 ENCOUNTER — HOSPITAL ENCOUNTER (OUTPATIENT)
Dept: CARDIAC REHAB | Facility: HOSPITAL | Age: 57
Discharge: HOME | End: 2017-04-28
Admitting: INTERNAL MEDICINE

## 2017-04-28 VITALS — DIASTOLIC BLOOD PRESSURE: 90 MMHG | SYSTOLIC BLOOD PRESSURE: 152 MMHG | HEART RATE: 90 BPM

## 2017-04-28 DIAGNOSIS — Z98.61 POST PTCA: Primary | ICD-10-CM

## 2017-04-28 PROCEDURE — 93798 PHYS/QHP OP CAR RHAB W/ECG: CPT

## 2017-05-03 ENCOUNTER — HOSPITAL ENCOUNTER (OUTPATIENT)
Dept: CARDIAC REHAB | Facility: HOSPITAL | Age: 57
Discharge: HOME | End: 2017-05-03
Admitting: INTERNAL MEDICINE

## 2017-05-03 VITALS
WEIGHT: 213 LBS | BODY MASS INDEX: 29.71 KG/M2 | DIASTOLIC BLOOD PRESSURE: 84 MMHG | SYSTOLIC BLOOD PRESSURE: 137 MMHG | HEART RATE: 73 BPM

## 2017-05-03 DIAGNOSIS — Z98.61 POST PTCA: Primary | ICD-10-CM

## 2017-05-03 PROCEDURE — 93798 PHYS/QHP OP CAR RHAB W/ECG: CPT

## 2017-05-05 ENCOUNTER — HOSPITAL ENCOUNTER (OUTPATIENT)
Dept: CARDIAC REHAB | Facility: HOSPITAL | Age: 57
Discharge: HOME | End: 2017-05-05
Admitting: INTERNAL MEDICINE

## 2017-05-05 VITALS — SYSTOLIC BLOOD PRESSURE: 158 MMHG | HEART RATE: 107 BPM | DIASTOLIC BLOOD PRESSURE: 84 MMHG

## 2017-05-05 DIAGNOSIS — Z98.61 POST PTCA: Primary | ICD-10-CM

## 2017-05-05 PROCEDURE — 93798 PHYS/QHP OP CAR RHAB W/ECG: CPT

## 2017-05-08 ENCOUNTER — HOSPITAL ENCOUNTER (OUTPATIENT)
Dept: CARDIAC REHAB | Facility: HOSPITAL | Age: 57
Discharge: HOME | End: 2017-05-08
Admitting: INTERNAL MEDICINE

## 2017-05-08 VITALS
HEART RATE: 81 BPM | SYSTOLIC BLOOD PRESSURE: 134 MMHG | DIASTOLIC BLOOD PRESSURE: 74 MMHG | WEIGHT: 212 LBS | BODY MASS INDEX: 29.57 KG/M2

## 2017-05-08 DIAGNOSIS — Z98.61 POST PTCA: Primary | ICD-10-CM

## 2017-05-08 PROCEDURE — 93798 PHYS/QHP OP CAR RHAB W/ECG: CPT

## 2017-05-10 ENCOUNTER — HOSPITAL ENCOUNTER (OUTPATIENT)
Dept: CARDIAC REHAB | Facility: HOSPITAL | Age: 57
Discharge: HOME | End: 2017-05-10
Admitting: INTERNAL MEDICINE

## 2017-05-10 VITALS — DIASTOLIC BLOOD PRESSURE: 89 MMHG | SYSTOLIC BLOOD PRESSURE: 151 MMHG | HEART RATE: 84 BPM

## 2017-05-10 DIAGNOSIS — Z98.61 POST PTCA: Primary | ICD-10-CM

## 2017-05-10 PROCEDURE — 93798 PHYS/QHP OP CAR RHAB W/ECG: CPT

## 2017-05-12 ENCOUNTER — HOSPITAL ENCOUNTER (OUTPATIENT)
Dept: CARDIAC REHAB | Facility: HOSPITAL | Age: 57
Discharge: HOME | End: 2017-05-12
Admitting: INTERNAL MEDICINE

## 2017-05-12 VITALS — DIASTOLIC BLOOD PRESSURE: 89 MMHG | HEART RATE: 95 BPM | SYSTOLIC BLOOD PRESSURE: 135 MMHG

## 2017-05-12 DIAGNOSIS — Z98.61 POST PTCA: Primary | ICD-10-CM

## 2017-05-12 PROCEDURE — 93798 PHYS/QHP OP CAR RHAB W/ECG: CPT

## 2017-05-17 ENCOUNTER — HOSPITAL ENCOUNTER (OUTPATIENT)
Dept: CARDIAC REHAB | Facility: HOSPITAL | Age: 57
Discharge: HOME | End: 2017-05-17
Admitting: INTERNAL MEDICINE

## 2017-05-17 VITALS
HEART RATE: 93 BPM | WEIGHT: 212 LBS | SYSTOLIC BLOOD PRESSURE: 157 MMHG | DIASTOLIC BLOOD PRESSURE: 85 MMHG | BODY MASS INDEX: 29.57 KG/M2

## 2017-05-17 DIAGNOSIS — Z98.61 POST PTCA: Primary | ICD-10-CM

## 2017-05-17 PROCEDURE — 93798 PHYS/QHP OP CAR RHAB W/ECG: CPT

## 2017-06-02 ENCOUNTER — HOSPITAL ENCOUNTER (OUTPATIENT)
Dept: CARDIAC REHAB | Facility: HOSPITAL | Age: 57
Discharge: HOME | End: 2017-06-02
Admitting: INTERNAL MEDICINE

## 2017-06-02 VITALS
DIASTOLIC BLOOD PRESSURE: 82 MMHG | BODY MASS INDEX: 29.78 KG/M2 | HEART RATE: 87 BPM | WEIGHT: 213.5 LBS | SYSTOLIC BLOOD PRESSURE: 141 MMHG

## 2017-06-02 DIAGNOSIS — Z98.61 POST PTCA: Primary | ICD-10-CM

## 2017-06-02 PROCEDURE — 93798 PHYS/QHP OP CAR RHAB W/ECG: CPT

## 2017-06-02 NOTE — ADDENDUM NOTE
Encounter addended by: Cady See RD on: 6/2/2017 12:40 PM<BR>     Actions taken: Visit Navigator Flowsheet section accepted

## 2017-06-07 ENCOUNTER — HOSPITAL ENCOUNTER (OUTPATIENT)
Dept: CARDIAC REHAB | Facility: HOSPITAL | Age: 57
Setting detail: THERAPIES SERIES
Discharge: HOME | End: 2017-06-07

## 2017-06-07 VITALS — DIASTOLIC BLOOD PRESSURE: 87 MMHG | SYSTOLIC BLOOD PRESSURE: 136 MMHG | HEART RATE: 94 BPM

## 2017-06-07 DIAGNOSIS — Z98.61 POST PTCA: Primary | ICD-10-CM

## 2017-06-07 PROCEDURE — 93798 PHYS/QHP OP CAR RHAB W/ECG: CPT

## 2017-06-09 ENCOUNTER — HOSPITAL ENCOUNTER (OUTPATIENT)
Dept: CARDIAC REHAB | Facility: HOSPITAL | Age: 57
Setting detail: THERAPIES SERIES
Discharge: HOME | End: 2017-06-09

## 2017-06-09 VITALS — DIASTOLIC BLOOD PRESSURE: 84 MMHG | SYSTOLIC BLOOD PRESSURE: 133 MMHG | HEART RATE: 85 BPM

## 2017-06-09 DIAGNOSIS — Z98.61 POST PTCA: Primary | ICD-10-CM

## 2017-06-09 PROCEDURE — 93798 PHYS/QHP OP CAR RHAB W/ECG: CPT

## 2017-06-14 ENCOUNTER — HOSPITAL ENCOUNTER (OUTPATIENT)
Dept: CARDIAC REHAB | Facility: HOSPITAL | Age: 57
Setting detail: THERAPIES SERIES
Discharge: HOME | End: 2017-06-14

## 2017-06-14 VITALS — SYSTOLIC BLOOD PRESSURE: 136 MMHG | HEART RATE: 98 BPM | DIASTOLIC BLOOD PRESSURE: 81 MMHG

## 2017-06-14 DIAGNOSIS — Z98.61 POST PTCA: Primary | ICD-10-CM

## 2017-06-14 PROCEDURE — 93798 PHYS/QHP OP CAR RHAB W/ECG: CPT

## 2017-06-19 ENCOUNTER — HOSPITAL ENCOUNTER (OUTPATIENT)
Dept: CARDIAC REHAB | Facility: HOSPITAL | Age: 57
Setting detail: THERAPIES SERIES
Discharge: HOME | End: 2017-06-19

## 2017-06-19 VITALS
SYSTOLIC BLOOD PRESSURE: 137 MMHG | BODY MASS INDEX: 30.4 KG/M2 | HEART RATE: 80 BPM | DIASTOLIC BLOOD PRESSURE: 81 MMHG | WEIGHT: 218 LBS

## 2017-06-19 DIAGNOSIS — Z98.61 POST PTCA: Primary | ICD-10-CM

## 2017-06-19 PROCEDURE — 93798 PHYS/QHP OP CAR RHAB W/ECG: CPT

## 2017-06-21 ENCOUNTER — HOSPITAL ENCOUNTER (OUTPATIENT)
Dept: CARDIAC REHAB | Facility: HOSPITAL | Age: 57
Setting detail: THERAPIES SERIES
Discharge: HOME | End: 2017-06-21

## 2017-06-21 VITALS — SYSTOLIC BLOOD PRESSURE: 125 MMHG | HEART RATE: 88 BPM | DIASTOLIC BLOOD PRESSURE: 71 MMHG

## 2017-06-21 DIAGNOSIS — Z98.61 POST PTCA: Primary | ICD-10-CM

## 2017-06-21 PROCEDURE — 93798 PHYS/QHP OP CAR RHAB W/ECG: CPT

## 2017-06-23 ENCOUNTER — HOSPITAL ENCOUNTER (OUTPATIENT)
Dept: CARDIAC REHAB | Facility: HOSPITAL | Age: 57
Setting detail: THERAPIES SERIES
Discharge: HOME | End: 2017-06-23

## 2017-06-23 VITALS — DIASTOLIC BLOOD PRESSURE: 79 MMHG | SYSTOLIC BLOOD PRESSURE: 151 MMHG | HEART RATE: 93 BPM

## 2017-06-23 DIAGNOSIS — Z98.61 POST PTCA: Primary | ICD-10-CM

## 2017-06-23 PROCEDURE — 93798 PHYS/QHP OP CAR RHAB W/ECG: CPT

## 2017-07-05 ENCOUNTER — HOSPITAL ENCOUNTER (OUTPATIENT)
Dept: CARDIAC REHAB | Facility: HOSPITAL | Age: 57
Setting detail: THERAPIES SERIES
Discharge: HOME | End: 2017-07-05

## 2017-07-05 VITALS
SYSTOLIC BLOOD PRESSURE: 148 MMHG | DIASTOLIC BLOOD PRESSURE: 85 MMHG | BODY MASS INDEX: 29.85 KG/M2 | WEIGHT: 214 LBS | HEART RATE: 86 BPM

## 2017-07-05 DIAGNOSIS — Z98.61 POST PTCA: Primary | ICD-10-CM

## 2017-07-05 PROCEDURE — 93798 PHYS/QHP OP CAR RHAB W/ECG: CPT

## 2017-07-07 ENCOUNTER — HOSPITAL ENCOUNTER (OUTPATIENT)
Dept: CARDIAC REHAB | Facility: HOSPITAL | Age: 57
Setting detail: THERAPIES SERIES
Discharge: HOME | End: 2017-07-07

## 2017-07-07 VITALS — SYSTOLIC BLOOD PRESSURE: 133 MMHG | DIASTOLIC BLOOD PRESSURE: 89 MMHG | HEART RATE: 92 BPM

## 2017-07-07 DIAGNOSIS — Z98.61 POST PTCA: Primary | ICD-10-CM

## 2017-07-07 PROCEDURE — 93798 PHYS/QHP OP CAR RHAB W/ECG: CPT

## 2017-07-10 ENCOUNTER — HOSPITAL ENCOUNTER (OUTPATIENT)
Dept: CARDIAC REHAB | Facility: HOSPITAL | Age: 57
Setting detail: THERAPIES SERIES
Discharge: HOME | End: 2017-07-10

## 2017-07-10 VITALS
BODY MASS INDEX: 29.57 KG/M2 | DIASTOLIC BLOOD PRESSURE: 82 MMHG | SYSTOLIC BLOOD PRESSURE: 136 MMHG | WEIGHT: 212 LBS | HEART RATE: 85 BPM

## 2017-07-10 DIAGNOSIS — Z98.61 POST PTCA: Primary | ICD-10-CM

## 2017-07-10 PROCEDURE — 93798 PHYS/QHP OP CAR RHAB W/ECG: CPT

## 2017-07-10 NOTE — ADDENDUM NOTE
Encounter addended by: Domi Lu, RRT on: 7/10/2017  9:27 AM<BR>     Actions taken: Chief Complaint modified, Episode edited, Vitals modified, Charge Capture section accepted, Sign clinical note

## 2017-07-19 ENCOUNTER — HOSPITAL ENCOUNTER (OUTPATIENT)
Dept: CARDIAC REHAB | Facility: HOSPITAL | Age: 57
Setting detail: THERAPIES SERIES
Discharge: HOME | End: 2017-07-19

## 2017-07-19 VITALS
DIASTOLIC BLOOD PRESSURE: 89 MMHG | WEIGHT: 214 LBS | HEART RATE: 87 BPM | BODY MASS INDEX: 29.85 KG/M2 | SYSTOLIC BLOOD PRESSURE: 124 MMHG

## 2017-07-19 DIAGNOSIS — Z98.61 POST PTCA: Primary | ICD-10-CM

## 2017-07-19 PROCEDURE — 93798 PHYS/QHP OP CAR RHAB W/ECG: CPT

## 2017-07-24 ENCOUNTER — HOSPITAL ENCOUNTER (OUTPATIENT)
Dept: CARDIAC REHAB | Facility: HOSPITAL | Age: 57
Setting detail: THERAPIES SERIES
Discharge: HOME | End: 2017-07-24

## 2017-07-24 VITALS
SYSTOLIC BLOOD PRESSURE: 122 MMHG | BODY MASS INDEX: 29.29 KG/M2 | DIASTOLIC BLOOD PRESSURE: 82 MMHG | HEART RATE: 82 BPM | WEIGHT: 210 LBS

## 2017-07-24 DIAGNOSIS — Z98.61 POST PTCA: Primary | ICD-10-CM

## 2017-07-24 PROCEDURE — 93798 PHYS/QHP OP CAR RHAB W/ECG: CPT

## 2017-07-26 ENCOUNTER — HOSPITAL ENCOUNTER (OUTPATIENT)
Dept: CARDIAC REHAB | Facility: HOSPITAL | Age: 57
Setting detail: THERAPIES SERIES
Discharge: HOME | End: 2017-07-26

## 2017-07-26 VITALS — HEART RATE: 80 BPM | DIASTOLIC BLOOD PRESSURE: 80 MMHG | SYSTOLIC BLOOD PRESSURE: 119 MMHG

## 2017-07-26 DIAGNOSIS — Z98.61 POST PTCA: Primary | ICD-10-CM

## 2017-07-26 PROCEDURE — 93798 PHYS/QHP OP CAR RHAB W/ECG: CPT

## 2017-07-31 ENCOUNTER — HOSPITAL ENCOUNTER (OUTPATIENT)
Dept: CARDIAC REHAB | Facility: HOSPITAL | Age: 57
Setting detail: THERAPIES SERIES
Discharge: HOME | End: 2017-07-31

## 2017-07-31 VITALS
DIASTOLIC BLOOD PRESSURE: 86 MMHG | SYSTOLIC BLOOD PRESSURE: 148 MMHG | HEART RATE: 82 BPM | WEIGHT: 210 LBS | BODY MASS INDEX: 29.29 KG/M2

## 2017-07-31 DIAGNOSIS — Z98.61 POST PTCA: Primary | ICD-10-CM

## 2017-07-31 PROCEDURE — 93798 PHYS/QHP OP CAR RHAB W/ECG: CPT

## 2017-08-04 ENCOUNTER — HOSPITAL ENCOUNTER (OUTPATIENT)
Dept: CARDIAC REHAB | Facility: HOSPITAL | Age: 57
Setting detail: THERAPIES SERIES
Discharge: HOME | End: 2017-08-04

## 2017-08-04 VITALS — SYSTOLIC BLOOD PRESSURE: 138 MMHG | HEART RATE: 89 BPM | DIASTOLIC BLOOD PRESSURE: 85 MMHG

## 2017-08-04 DIAGNOSIS — Z98.61 POST PTCA: Primary | ICD-10-CM

## 2017-08-04 PROCEDURE — 93798 PHYS/QHP OP CAR RHAB W/ECG: CPT

## 2017-08-07 ENCOUNTER — HOSPITAL ENCOUNTER (OUTPATIENT)
Dept: CARDIAC REHAB | Facility: HOSPITAL | Age: 57
Setting detail: THERAPIES SERIES
Discharge: HOME | End: 2017-08-07

## 2017-08-07 VITALS
WEIGHT: 214 LBS | SYSTOLIC BLOOD PRESSURE: 133 MMHG | BODY MASS INDEX: 29.85 KG/M2 | HEART RATE: 83 BPM | DIASTOLIC BLOOD PRESSURE: 82 MMHG

## 2017-08-07 DIAGNOSIS — Z98.61 POST PTCA: Primary | ICD-10-CM

## 2017-08-07 PROCEDURE — 93798 PHYS/QHP OP CAR RHAB W/ECG: CPT

## 2017-08-11 ENCOUNTER — HOSPITAL ENCOUNTER (OUTPATIENT)
Dept: CARDIAC REHAB | Facility: HOSPITAL | Age: 57
Setting detail: THERAPIES SERIES
Discharge: HOME | End: 2017-08-11

## 2017-08-11 VITALS
SYSTOLIC BLOOD PRESSURE: 137 MMHG | BODY MASS INDEX: 29.71 KG/M2 | HEART RATE: 96 BPM | DIASTOLIC BLOOD PRESSURE: 94 MMHG | WEIGHT: 213 LBS

## 2017-08-11 DIAGNOSIS — Z98.61 POST PTCA: Primary | ICD-10-CM

## 2017-08-11 PROCEDURE — 93798 PHYS/QHP OP CAR RHAB W/ECG: CPT

## 2017-08-18 ENCOUNTER — HOSPITAL ENCOUNTER (OUTPATIENT)
Dept: CARDIAC REHAB | Facility: HOSPITAL | Age: 57
Setting detail: THERAPIES SERIES
Discharge: HOME | End: 2017-08-18

## 2017-08-18 VITALS
DIASTOLIC BLOOD PRESSURE: 87 MMHG | HEART RATE: 82 BPM | SYSTOLIC BLOOD PRESSURE: 151 MMHG | WEIGHT: 216 LBS | BODY MASS INDEX: 30.13 KG/M2

## 2017-08-18 DIAGNOSIS — Z98.61 POST PTCA: Primary | ICD-10-CM

## 2017-08-18 PROCEDURE — 93798 PHYS/QHP OP CAR RHAB W/ECG: CPT

## 2017-08-21 ENCOUNTER — APPOINTMENT (OUTPATIENT)
Dept: CARDIAC REHAB | Facility: HOSPITAL | Age: 57
End: 2017-08-21

## 2017-08-23 ENCOUNTER — APPOINTMENT (OUTPATIENT)
Dept: CARDIAC REHAB | Facility: HOSPITAL | Age: 57
End: 2017-08-23

## 2017-08-25 ENCOUNTER — APPOINTMENT (OUTPATIENT)
Dept: CARDIAC REHAB | Facility: HOSPITAL | Age: 57
End: 2017-08-25

## 2017-08-28 ENCOUNTER — APPOINTMENT (OUTPATIENT)
Dept: CARDIAC REHAB | Facility: HOSPITAL | Age: 57
End: 2017-08-28

## 2017-08-30 ENCOUNTER — APPOINTMENT (OUTPATIENT)
Dept: CARDIAC REHAB | Facility: HOSPITAL | Age: 57
End: 2017-08-30

## 2017-09-01 ENCOUNTER — APPOINTMENT (OUTPATIENT)
Dept: CARDIAC REHAB | Facility: HOSPITAL | Age: 57
End: 2017-09-01

## 2017-09-06 ENCOUNTER — APPOINTMENT (OUTPATIENT)
Dept: CARDIAC REHAB | Facility: HOSPITAL | Age: 57
End: 2017-09-06

## 2017-09-08 ENCOUNTER — APPOINTMENT (OUTPATIENT)
Dept: CARDIAC REHAB | Facility: HOSPITAL | Age: 57
End: 2017-09-08

## 2017-12-15 ENCOUNTER — HOSPITAL ENCOUNTER (OUTPATIENT)
Dept: CARDIAC REHAB | Facility: HOSPITAL | Age: 57
Setting detail: THERAPIES SERIES
Discharge: HOME | End: 2017-12-15

## 2017-12-15 DIAGNOSIS — Z98.61 POST PTCA: Primary | ICD-10-CM

## 2017-12-15 PROCEDURE — 93798 PHYS/QHP OP CAR RHAB W/ECG: CPT

## 2017-12-15 RX ORDER — BUDESONIDE AND FORMOTEROL FUMARATE DIHYDRATE 160; 4.5 UG/1; UG/1
2 AEROSOL RESPIRATORY (INHALATION)
COMMUNITY

## 2017-12-15 RX ORDER — ALBUTEROL SULFATE 90 UG/1
2 AEROSOL, METERED RESPIRATORY (INHALATION)
COMMUNITY

## 2017-12-15 RX ORDER — CLOPIDOGREL BISULFATE 75 MG/1
75 TABLET ORAL DAILY
COMMUNITY

## 2017-12-15 NOTE — PROGRESS NOTES
Cardiac Rehab Initial Assessment      Name: Eitan Richards  :1960 Allergies:Review of patient's allergies indicates no known allergies.   MRN: [id number] 56 y.o. Physician: No Known Provider   Primary Diagnosis:    Diagnosis Plan   1. Post PTCA      Event Date: 2017 Specialist: Magaly Alvarez    Risk Stratification:Low Risk Note Author: Niki Rodriguez RN     Cardiovascular History: Previous MI and stent     EXERCISE AT HOME  no         Ambulatory Status:Independent  Ambulatory Fall Risk Assessed on Initial Visit: yes 6 Minute Walk Pre- Cardiac Rehab:  Distance:1404ft      RPE:6  Max. HR: 114       SPO2:95    MET: 2.8  MPH: 2.7              Resting BP: 145/85 LA, 131/77 RA    Peak BP: 161/86  Recovery BP: 145/84       NUTRITION  Lipids:yes If yes, labs as follows;  Total:  HDL:   HDL Cholesterol   Date Value Ref Range Status   2017 52 (L) 60 - 200 mg/dL Final    Lipids continued:  LDL:No results found for: LDL  Triglyceride: No components found for: TRIGLYCERIDE   Weight Management:                 Weight: 221  Height: 71                                   BMI: There is no height or weight on file to calculate BMI.  Waist Circumference: 40  inches   Alcohol Use: none Diabetes:Yes,  Monitors BS at home- yes, Frequency: 2 times daily, Random BS: 181              Learning Barriers:Ready to Learn    Family Support:yes    Living Arrangement: lives with their spouse       Tobacco Adjunct: No        Comorbidities: Diabetes Mellitus     PSYCHOSOCIAL  Clinical Depression: yes    Stress: yes     Assess presence or absence of depression using a valid screening tool: yes       Angina: no          Today are you having any other pain? Yes. If, Yes, Scale: 2    States his left shoulder always hurts at least 2/10 Diagnosed with Hypertension:yes    Heart Sounds: wnl     Lung Sounds: normal air entry, lungs clear to auscultation         Assessment: wnl Orthopedic Problems: left shoulder    Are you being hurt,  hit, or frightened by anyone at home or in your life? no    Are you being neglected by a caregiver? N/A        Assessment: wnl    Family attends IA: no Time of arrival: 1000  Time of departure: 1100     Patient Goals: increase strength and stamina         12/15/2017  11:14 AM  Niki Rodriguez RN

## 2017-12-18 ENCOUNTER — HOSPITAL ENCOUNTER (OUTPATIENT)
Dept: CARDIAC REHAB | Facility: HOSPITAL | Age: 57
Setting detail: THERAPIES SERIES
Discharge: HOME | End: 2017-12-18

## 2017-12-18 VITALS
HEART RATE: 99 BPM | WEIGHT: 219 LBS | SYSTOLIC BLOOD PRESSURE: 129 MMHG | BODY MASS INDEX: 30.54 KG/M2 | DIASTOLIC BLOOD PRESSURE: 83 MMHG

## 2017-12-18 DIAGNOSIS — Z98.61 POST PTCA: Primary | ICD-10-CM

## 2017-12-18 PROCEDURE — 93798 PHYS/QHP OP CAR RHAB W/ECG: CPT

## 2017-12-22 ENCOUNTER — HOSPITAL ENCOUNTER (OUTPATIENT)
Dept: CARDIAC REHAB | Facility: HOSPITAL | Age: 57
Setting detail: THERAPIES SERIES
Discharge: HOME | End: 2017-12-22

## 2017-12-22 VITALS — DIASTOLIC BLOOD PRESSURE: 81 MMHG | SYSTOLIC BLOOD PRESSURE: 155 MMHG | HEART RATE: 114 BPM

## 2017-12-22 DIAGNOSIS — Z98.61 POST PTCA: Primary | ICD-10-CM

## 2017-12-22 PROCEDURE — 93798 PHYS/QHP OP CAR RHAB W/ECG: CPT

## 2017-12-29 ENCOUNTER — HOSPITAL ENCOUNTER (OUTPATIENT)
Dept: CARDIAC REHAB | Facility: HOSPITAL | Age: 57
Setting detail: THERAPIES SERIES
Discharge: HOME | End: 2017-12-29

## 2017-12-29 VITALS
WEIGHT: 221 LBS | HEART RATE: 113 BPM | DIASTOLIC BLOOD PRESSURE: 84 MMHG | BODY MASS INDEX: 30.82 KG/M2 | SYSTOLIC BLOOD PRESSURE: 149 MMHG

## 2017-12-29 DIAGNOSIS — Z98.61 POST PTCA: Primary | ICD-10-CM

## 2017-12-29 PROCEDURE — 93798 PHYS/QHP OP CAR RHAB W/ECG: CPT

## 2018-01-05 ENCOUNTER — HOSPITAL ENCOUNTER (OUTPATIENT)
Dept: CARDIAC REHAB | Facility: HOSPITAL | Age: 58
Setting detail: THERAPIES SERIES
Discharge: HOME | End: 2018-01-05

## 2018-01-05 VITALS
WEIGHT: 219 LBS | SYSTOLIC BLOOD PRESSURE: 149 MMHG | HEART RATE: 75 BPM | DIASTOLIC BLOOD PRESSURE: 83 MMHG | BODY MASS INDEX: 30.54 KG/M2

## 2018-01-05 DIAGNOSIS — Z98.61 POST PTCA: Primary | ICD-10-CM

## 2018-01-05 PROCEDURE — 93798 PHYS/QHP OP CAR RHAB W/ECG: CPT

## 2018-01-08 ENCOUNTER — HOSPITAL ENCOUNTER (OUTPATIENT)
Dept: CARDIAC REHAB | Facility: HOSPITAL | Age: 58
Setting detail: THERAPIES SERIES
Discharge: HOME | End: 2018-01-08

## 2018-01-08 VITALS
DIASTOLIC BLOOD PRESSURE: 88 MMHG | HEART RATE: 98 BPM | WEIGHT: 220 LBS | SYSTOLIC BLOOD PRESSURE: 144 MMHG | BODY MASS INDEX: 30.68 KG/M2

## 2018-01-08 DIAGNOSIS — Z98.61 POST PTCA: Primary | ICD-10-CM

## 2018-01-08 PROCEDURE — 93798 PHYS/QHP OP CAR RHAB W/ECG: CPT

## 2018-01-17 ENCOUNTER — APPOINTMENT (OUTPATIENT)
Dept: CARDIAC REHAB | Facility: HOSPITAL | Age: 58
End: 2018-01-17

## 2018-01-24 ENCOUNTER — HOSPITAL ENCOUNTER (OUTPATIENT)
Dept: CARDIAC REHAB | Facility: HOSPITAL | Age: 58
Setting detail: THERAPIES SERIES
Discharge: HOME | End: 2018-01-24

## 2018-01-24 VITALS
SYSTOLIC BLOOD PRESSURE: 149 MMHG | DIASTOLIC BLOOD PRESSURE: 92 MMHG | HEART RATE: 99 BPM | BODY MASS INDEX: 30.82 KG/M2 | WEIGHT: 221 LBS

## 2018-01-24 DIAGNOSIS — Z98.61 POST PTCA: Primary | ICD-10-CM

## 2018-01-24 PROCEDURE — 93798 PHYS/QHP OP CAR RHAB W/ECG: CPT

## 2018-01-26 ENCOUNTER — HOSPITAL ENCOUNTER (OUTPATIENT)
Dept: CARDIAC REHAB | Facility: HOSPITAL | Age: 58
Setting detail: THERAPIES SERIES
Discharge: HOME | End: 2018-01-26

## 2018-01-26 VITALS — HEART RATE: 113 BPM | DIASTOLIC BLOOD PRESSURE: 85 MMHG | SYSTOLIC BLOOD PRESSURE: 166 MMHG

## 2018-01-26 DIAGNOSIS — Z98.61 POST PTCA: Primary | ICD-10-CM

## 2018-01-26 PROCEDURE — 93798 PHYS/QHP OP CAR RHAB W/ECG: CPT

## 2018-01-29 ENCOUNTER — HOSPITAL ENCOUNTER (OUTPATIENT)
Dept: CARDIAC REHAB | Facility: HOSPITAL | Age: 58
Setting detail: THERAPIES SERIES
Discharge: HOME | End: 2018-01-29

## 2018-01-29 VITALS
BODY MASS INDEX: 30.8 KG/M2 | DIASTOLIC BLOOD PRESSURE: 85 MMHG | HEIGHT: 71 IN | WEIGHT: 220 LBS | HEART RATE: 107 BPM | SYSTOLIC BLOOD PRESSURE: 149 MMHG

## 2018-01-29 DIAGNOSIS — Z98.61 POST PTCA: Primary | ICD-10-CM

## 2018-01-29 PROCEDURE — 93798 PHYS/QHP OP CAR RHAB W/ECG: CPT

## 2018-01-29 NOTE — ADDENDUM NOTE
Encounter addended by: Sheila Lorenz, RRT on: 1/29/2018 10:27 AM<BR>     Actions taken: Vitals modified

## 2018-02-07 ENCOUNTER — HOSPITAL ENCOUNTER (OUTPATIENT)
Dept: CARDIAC REHAB | Facility: HOSPITAL | Age: 58
Setting detail: THERAPIES SERIES
Discharge: HOME | End: 2018-02-07

## 2018-02-07 VITALS
HEIGHT: 71 IN | BODY MASS INDEX: 29.68 KG/M2 | WEIGHT: 212 LBS | HEART RATE: 90 BPM | DIASTOLIC BLOOD PRESSURE: 83 MMHG | SYSTOLIC BLOOD PRESSURE: 144 MMHG

## 2018-02-07 DIAGNOSIS — Z98.61 POST PTCA: Primary | ICD-10-CM

## 2018-02-07 PROCEDURE — 93798 PHYS/QHP OP CAR RHAB W/ECG: CPT

## 2018-02-09 ENCOUNTER — HOSPITAL ENCOUNTER (OUTPATIENT)
Dept: CARDIAC REHAB | Facility: HOSPITAL | Age: 58
Setting detail: THERAPIES SERIES
Discharge: HOME | End: 2018-02-09

## 2018-02-09 VITALS — HEART RATE: 93 BPM | DIASTOLIC BLOOD PRESSURE: 76 MMHG | SYSTOLIC BLOOD PRESSURE: 123 MMHG

## 2018-02-09 DIAGNOSIS — Z98.61 POST PTCA: Primary | ICD-10-CM

## 2018-02-09 PROCEDURE — 93798 PHYS/QHP OP CAR RHAB W/ECG: CPT

## 2018-02-14 ENCOUNTER — HOSPITAL ENCOUNTER (OUTPATIENT)
Dept: CARDIAC REHAB | Facility: HOSPITAL | Age: 58
Setting detail: THERAPIES SERIES
Discharge: HOME | End: 2018-02-14

## 2018-02-14 VITALS
SYSTOLIC BLOOD PRESSURE: 135 MMHG | WEIGHT: 216.5 LBS | HEART RATE: 113 BPM | BODY MASS INDEX: 30.2 KG/M2 | DIASTOLIC BLOOD PRESSURE: 74 MMHG

## 2018-02-14 DIAGNOSIS — Z98.61 POST PTCA: Primary | ICD-10-CM

## 2018-02-14 PROCEDURE — 93798 PHYS/QHP OP CAR RHAB W/ECG: CPT

## 2018-02-16 ENCOUNTER — HOSPITAL ENCOUNTER (OUTPATIENT)
Dept: CARDIAC REHAB | Facility: HOSPITAL | Age: 58
Setting detail: THERAPIES SERIES
Discharge: HOME | End: 2018-02-16

## 2018-02-16 VITALS — SYSTOLIC BLOOD PRESSURE: 132 MMHG | HEART RATE: 91 BPM | DIASTOLIC BLOOD PRESSURE: 89 MMHG

## 2018-02-16 DIAGNOSIS — Z98.61 POST PTCA: Primary | ICD-10-CM

## 2018-02-16 PROCEDURE — 93798 PHYS/QHP OP CAR RHAB W/ECG: CPT

## 2018-02-19 ENCOUNTER — HOSPITAL ENCOUNTER (OUTPATIENT)
Dept: CARDIAC REHAB | Facility: HOSPITAL | Age: 58
Setting detail: THERAPIES SERIES
Discharge: HOME | End: 2018-02-19

## 2018-02-19 VITALS
HEART RATE: 85 BPM | DIASTOLIC BLOOD PRESSURE: 82 MMHG | BODY MASS INDEX: 29.57 KG/M2 | WEIGHT: 212 LBS | SYSTOLIC BLOOD PRESSURE: 126 MMHG

## 2018-02-19 DIAGNOSIS — Z98.61 POST PTCA: Primary | ICD-10-CM

## 2018-02-19 PROCEDURE — 93798 PHYS/QHP OP CAR RHAB W/ECG: CPT

## 2018-02-21 ENCOUNTER — APPOINTMENT (OUTPATIENT)
Dept: CARDIAC REHAB | Facility: HOSPITAL | Age: 58
End: 2018-02-21

## 2018-02-23 ENCOUNTER — HOSPITAL ENCOUNTER (OUTPATIENT)
Dept: CARDIAC REHAB | Facility: HOSPITAL | Age: 58
Setting detail: THERAPIES SERIES
Discharge: HOME | End: 2018-02-23

## 2018-02-23 VITALS — HEART RATE: 81 BPM | SYSTOLIC BLOOD PRESSURE: 149 MMHG | DIASTOLIC BLOOD PRESSURE: 88 MMHG

## 2018-02-23 DIAGNOSIS — Z98.61 POST PTCA: Primary | ICD-10-CM

## 2018-02-23 PROCEDURE — 93798 PHYS/QHP OP CAR RHAB W/ECG: CPT

## 2018-02-26 ENCOUNTER — HOSPITAL ENCOUNTER (OUTPATIENT)
Dept: CARDIAC REHAB | Facility: HOSPITAL | Age: 58
Setting detail: THERAPIES SERIES
Discharge: HOME | End: 2018-02-26

## 2018-02-26 VITALS
WEIGHT: 213 LBS | BODY MASS INDEX: 29.71 KG/M2 | SYSTOLIC BLOOD PRESSURE: 132 MMHG | DIASTOLIC BLOOD PRESSURE: 85 MMHG | HEART RATE: 90 BPM

## 2018-02-26 DIAGNOSIS — Z98.61 POST PTCA: Primary | ICD-10-CM

## 2018-02-26 PROCEDURE — 93798 PHYS/QHP OP CAR RHAB W/ECG: CPT

## 2018-02-28 ENCOUNTER — HOSPITAL ENCOUNTER (OUTPATIENT)
Dept: CARDIAC REHAB | Facility: HOSPITAL | Age: 58
Setting detail: THERAPIES SERIES
Discharge: HOME | End: 2018-02-28

## 2018-02-28 VITALS — DIASTOLIC BLOOD PRESSURE: 84 MMHG | HEART RATE: 96 BPM | SYSTOLIC BLOOD PRESSURE: 142 MMHG

## 2018-02-28 DIAGNOSIS — Z98.61 POST PTCA: Primary | ICD-10-CM

## 2018-02-28 PROCEDURE — 93798 PHYS/QHP OP CAR RHAB W/ECG: CPT

## 2018-03-02 ENCOUNTER — HOSPITAL ENCOUNTER (OUTPATIENT)
Dept: CARDIAC REHAB | Facility: HOSPITAL | Age: 58
Setting detail: THERAPIES SERIES
Discharge: HOME | End: 2018-03-02

## 2018-03-02 VITALS
DIASTOLIC BLOOD PRESSURE: 79 MMHG | HEART RATE: 95 BPM | WEIGHT: 210 LBS | BODY MASS INDEX: 29.29 KG/M2 | SYSTOLIC BLOOD PRESSURE: 129 MMHG

## 2018-03-02 DIAGNOSIS — Z98.61 POST PTCA: Primary | ICD-10-CM

## 2018-03-02 PROCEDURE — 93798 PHYS/QHP OP CAR RHAB W/ECG: CPT

## 2018-03-05 ENCOUNTER — APPOINTMENT (OUTPATIENT)
Dept: CARDIAC REHAB | Facility: HOSPITAL | Age: 58
End: 2018-03-05

## 2018-03-07 ENCOUNTER — APPOINTMENT (OUTPATIENT)
Dept: CARDIAC REHAB | Facility: HOSPITAL | Age: 58
End: 2018-03-07

## 2018-03-09 ENCOUNTER — APPOINTMENT (OUTPATIENT)
Dept: CARDIAC REHAB | Facility: HOSPITAL | Age: 58
End: 2018-03-09

## 2018-03-12 ENCOUNTER — APPOINTMENT (OUTPATIENT)
Dept: CARDIAC REHAB | Facility: HOSPITAL | Age: 58
End: 2018-03-12

## 2018-03-14 ENCOUNTER — APPOINTMENT (OUTPATIENT)
Dept: CARDIAC REHAB | Facility: HOSPITAL | Age: 58
End: 2018-03-14

## 2018-03-16 ENCOUNTER — APPOINTMENT (OUTPATIENT)
Dept: CARDIAC REHAB | Facility: HOSPITAL | Age: 58
End: 2018-03-16

## 2018-03-19 ENCOUNTER — APPOINTMENT (OUTPATIENT)
Dept: CARDIAC REHAB | Facility: HOSPITAL | Age: 58
End: 2018-03-19

## 2019-10-28 ENCOUNTER — DOCUMENTATION (OUTPATIENT)
Dept: CARDIAC REHAB | Facility: HOSPITAL | Age: 59
End: 2019-10-28

## 2019-10-30 ENCOUNTER — TRANSCRIBE ORDERS (OUTPATIENT)
Dept: CARDIOLOGY | Facility: CLINIC | Age: 59
End: 2019-10-30

## 2019-10-30 DIAGNOSIS — Z98.61 POST PTCA: Primary | ICD-10-CM

## 2019-11-06 ENCOUNTER — HOSPITAL ENCOUNTER (OUTPATIENT)
Dept: CARDIAC REHAB | Facility: HOSPITAL | Age: 59
Setting detail: THERAPIES SERIES
Discharge: HOME | End: 2019-11-06

## 2019-11-06 VITALS
HEART RATE: 92 BPM | WEIGHT: 221 LBS | BODY MASS INDEX: 30.94 KG/M2 | HEIGHT: 71 IN | DIASTOLIC BLOOD PRESSURE: 89 MMHG | SYSTOLIC BLOOD PRESSURE: 130 MMHG | OXYGEN SATURATION: 96 %

## 2019-11-06 DIAGNOSIS — Z98.61 POST PTCA: Primary | ICD-10-CM

## 2019-11-06 PROCEDURE — 93798 PHYS/QHP OP CAR RHAB W/ECG: CPT

## 2019-11-06 NOTE — ADDENDUM NOTE
Encounter addended by: Niki Rodriguez RN on: 11/6/2019 1:12 PM   Actions taken: Chief Complaint modified, Vitals modified, Episode edited

## 2019-11-06 NOTE — PROGRESS NOTES
Cardiac Rehab Initial Assessment      Name: Eitan Richards  :1960 Allergies:Patient has no known allergies.   MRN: [id number] 58 y.o. Physician: Provider, No Known   Primary Diagnosis: PTCA/Stent Event Date: 10/01/19 Specialist: Jess Alvarez   Secondary Diagnosis: none Risk Stratification:Low Risk Note Author: Niki Rodriguez RN     Cardiovascular History: Previous PCI     EXERCISE AT HOME  yes  Walks 30-60 minutes daily          Ambulatory Status:Independent  Ambulatory Fall Risk Assessed on Initial Visit: yes 6 Minute Walk Pre- Cardiac Rehab:  Distance:1788ft      RPE:6  Max. HR: 117       SPO2:96    MET: 3.5  MPH: 3.4             RPD: 0  Resting BP: 130/89 LA, 134/95 RA    Peak BP: 134/96  Recovery BP: 142/93        NUTRITION  Lipids:yes If yes, labs as follows;  Total: No components found for: CHOLESTEROL  HDL:   HDL Cholesterol   Date Value Ref Range Status   2017 52 (L) 60 - 200 mg/dL Final    Lipids continued:  LDL:  LDL Cholesterol    Date Value Ref Range Status   2017 117 1 - 129 mg/dL Final     Triglyceride: No components found for: TRIGLYCERIDE   Weight Management:                 Weight: 221  Height: 71                                   BMI: There is no height or weight on file to calculate BMI.  Waist Circumference: 40  inches   Alcohol Use: none Diabetes:Yes,  Monitors BS at home- yes, Frequency: QOD, Random BS: 132    Last HGBA1C with date if applicable:No components found for: A1C         SOCIAL HISTORY  Social History     Socioeconomic History   • Marital status:      Spouse name: Not on file   • Number of children: Not on file   • Years of education: Not on file   • Highest education level: Not on file       Educational Level (choose one that applies) high school diploma/GED Learning Barriers:Ready to Learn    Family Support:yes    Living Arrangement: lives with their spouse         Tobacco Adjunct: N/A             PSYCHOSOCIAL  Clinical Depression:  yes    Stress: yes     Assess presence or absence of depression using a valid screening tool: yes      PHYSICAL ASSESSMENT    wnl        Angina: no  Denies any other pain today Diagnosed with Hypertension:yes    Heart Sounds: wnl     Lung Sounds: normal air entry, lungs clear to auscultation         Assessment: wnl Orthopedic Problems: back and hip pain   arthritis    Are you being hurt, hit, or frightened by anyone at home or in your life? no    Are you being neglected by a caregiver? N/A        Assessment: wnl    Family attends IA: no Time of arrival: 1000  Time of departure: 1115     Patient Goals: increase strength and stamina         11/6/2019  12:31 PM  Niki Rodriguez RN

## 2019-11-08 ENCOUNTER — HOSPITAL ENCOUNTER (OUTPATIENT)
Dept: CARDIAC REHAB | Facility: HOSPITAL | Age: 59
Setting detail: THERAPIES SERIES
Discharge: HOME | End: 2019-11-08

## 2019-11-08 VITALS — DIASTOLIC BLOOD PRESSURE: 77 MMHG | SYSTOLIC BLOOD PRESSURE: 139 MMHG | HEART RATE: 111 BPM

## 2019-11-08 DIAGNOSIS — Z98.61 POST PTCA: Primary | ICD-10-CM

## 2019-11-08 PROCEDURE — 93798 PHYS/QHP OP CAR RHAB W/ECG: CPT

## 2019-11-11 ENCOUNTER — HOSPITAL ENCOUNTER (OUTPATIENT)
Dept: CARDIAC REHAB | Facility: HOSPITAL | Age: 59
Setting detail: THERAPIES SERIES
Discharge: HOME | End: 2019-11-11

## 2019-11-11 VITALS
BODY MASS INDEX: 30.96 KG/M2 | HEART RATE: 93 BPM | DIASTOLIC BLOOD PRESSURE: 82 MMHG | SYSTOLIC BLOOD PRESSURE: 139 MMHG | WEIGHT: 222 LBS

## 2019-11-11 DIAGNOSIS — Z98.61 POST PTCA: Primary | ICD-10-CM

## 2019-11-11 PROCEDURE — 93798 PHYS/QHP OP CAR RHAB W/ECG: CPT

## 2019-11-13 ENCOUNTER — HOSPITAL ENCOUNTER (OUTPATIENT)
Dept: CARDIAC REHAB | Facility: HOSPITAL | Age: 59
Setting detail: THERAPIES SERIES
Discharge: HOME | End: 2019-11-13

## 2019-11-13 VITALS — HEART RATE: 97 BPM | DIASTOLIC BLOOD PRESSURE: 71 MMHG | SYSTOLIC BLOOD PRESSURE: 143 MMHG

## 2019-11-13 DIAGNOSIS — Z98.61 POST PTCA: Primary | ICD-10-CM

## 2019-11-13 PROCEDURE — 93798 PHYS/QHP OP CAR RHAB W/ECG: CPT

## 2019-11-15 ENCOUNTER — APPOINTMENT (OUTPATIENT)
Dept: CARDIAC REHAB | Facility: HOSPITAL | Age: 59
End: 2019-11-15

## 2019-11-18 ENCOUNTER — HOSPITAL ENCOUNTER (OUTPATIENT)
Dept: CARDIAC REHAB | Facility: HOSPITAL | Age: 59
Setting detail: THERAPIES SERIES
Discharge: HOME | End: 2019-11-18

## 2019-11-18 VITALS
DIASTOLIC BLOOD PRESSURE: 85 MMHG | HEIGHT: 71 IN | WEIGHT: 219 LBS | HEART RATE: 102 BPM | BODY MASS INDEX: 30.66 KG/M2 | SYSTOLIC BLOOD PRESSURE: 131 MMHG

## 2019-11-18 DIAGNOSIS — Z98.61 POST PTCA: Primary | ICD-10-CM

## 2019-11-18 PROCEDURE — 93798 PHYS/QHP OP CAR RHAB W/ECG: CPT

## 2019-11-20 ENCOUNTER — HOSPITAL ENCOUNTER (OUTPATIENT)
Dept: CARDIAC REHAB | Facility: HOSPITAL | Age: 59
Setting detail: THERAPIES SERIES
Discharge: HOME | End: 2019-11-20

## 2019-11-20 VITALS — SYSTOLIC BLOOD PRESSURE: 158 MMHG | HEART RATE: 104 BPM | DIASTOLIC BLOOD PRESSURE: 90 MMHG

## 2019-11-20 DIAGNOSIS — Z98.61 POST PTCA: Primary | ICD-10-CM

## 2019-11-20 PROCEDURE — 93798 PHYS/QHP OP CAR RHAB W/ECG: CPT

## 2019-11-22 ENCOUNTER — HOSPITAL ENCOUNTER (OUTPATIENT)
Dept: CARDIAC REHAB | Facility: HOSPITAL | Age: 59
Setting detail: THERAPIES SERIES
Discharge: HOME | End: 2019-11-22

## 2019-11-22 VITALS — SYSTOLIC BLOOD PRESSURE: 135 MMHG | DIASTOLIC BLOOD PRESSURE: 78 MMHG | HEART RATE: 95 BPM

## 2019-11-22 DIAGNOSIS — Z98.61 POST PTCA: Primary | ICD-10-CM

## 2019-11-22 PROCEDURE — 93798 PHYS/QHP OP CAR RHAB W/ECG: CPT

## 2019-11-25 ENCOUNTER — HOSPITAL ENCOUNTER (OUTPATIENT)
Dept: CARDIAC REHAB | Facility: HOSPITAL | Age: 59
Setting detail: THERAPIES SERIES
Discharge: HOME | End: 2019-11-25

## 2019-11-25 VITALS
BODY MASS INDEX: 30.9 KG/M2 | HEART RATE: 100 BPM | WEIGHT: 220.7 LBS | HEIGHT: 71 IN | SYSTOLIC BLOOD PRESSURE: 144 MMHG | DIASTOLIC BLOOD PRESSURE: 80 MMHG

## 2019-11-25 DIAGNOSIS — Z98.61 POST PTCA: Primary | ICD-10-CM

## 2019-11-25 PROCEDURE — 93798 PHYS/QHP OP CAR RHAB W/ECG: CPT

## 2019-12-06 ENCOUNTER — HOSPITAL ENCOUNTER (OUTPATIENT)
Dept: CARDIAC REHAB | Facility: HOSPITAL | Age: 59
Setting detail: THERAPIES SERIES
Discharge: HOME | End: 2019-12-06

## 2019-12-06 VITALS
HEIGHT: 71 IN | HEART RATE: 94 BPM | WEIGHT: 224 LBS | BODY MASS INDEX: 31.36 KG/M2 | SYSTOLIC BLOOD PRESSURE: 128 MMHG | DIASTOLIC BLOOD PRESSURE: 77 MMHG

## 2019-12-06 DIAGNOSIS — Z98.61 POST PTCA: Primary | ICD-10-CM

## 2019-12-06 PROCEDURE — 93798 PHYS/QHP OP CAR RHAB W/ECG: CPT

## 2019-12-09 ENCOUNTER — HOSPITAL ENCOUNTER (OUTPATIENT)
Dept: CARDIAC REHAB | Facility: HOSPITAL | Age: 59
Setting detail: THERAPIES SERIES
Discharge: HOME | End: 2019-12-09

## 2019-12-09 VITALS
HEART RATE: 97 BPM | SYSTOLIC BLOOD PRESSURE: 137 MMHG | DIASTOLIC BLOOD PRESSURE: 83 MMHG | WEIGHT: 222 LBS | BODY MASS INDEX: 30.96 KG/M2

## 2019-12-09 DIAGNOSIS — Z98.61 POST PTCA: Primary | ICD-10-CM

## 2019-12-09 PROCEDURE — 93798 PHYS/QHP OP CAR RHAB W/ECG: CPT

## 2019-12-13 ENCOUNTER — HOSPITAL ENCOUNTER (OUTPATIENT)
Dept: CARDIAC REHAB | Facility: HOSPITAL | Age: 59
Setting detail: THERAPIES SERIES
Discharge: HOME | End: 2019-12-13

## 2019-12-13 VITALS — SYSTOLIC BLOOD PRESSURE: 133 MMHG | HEART RATE: 99 BPM | DIASTOLIC BLOOD PRESSURE: 76 MMHG

## 2019-12-13 DIAGNOSIS — Z98.61 POST PTCA: Primary | ICD-10-CM

## 2019-12-13 PROCEDURE — 93798 PHYS/QHP OP CAR RHAB W/ECG: CPT

## 2019-12-16 ENCOUNTER — HOSPITAL ENCOUNTER (OUTPATIENT)
Dept: CARDIAC REHAB | Facility: HOSPITAL | Age: 59
Setting detail: THERAPIES SERIES
Discharge: HOME | End: 2019-12-16

## 2019-12-16 VITALS
SYSTOLIC BLOOD PRESSURE: 131 MMHG | WEIGHT: 218 LBS | DIASTOLIC BLOOD PRESSURE: 74 MMHG | BODY MASS INDEX: 30.52 KG/M2 | HEIGHT: 71 IN | HEART RATE: 116 BPM

## 2019-12-16 DIAGNOSIS — Z98.61 POST PTCA: Primary | ICD-10-CM

## 2019-12-16 PROCEDURE — 93798 PHYS/QHP OP CAR RHAB W/ECG: CPT

## 2019-12-18 ENCOUNTER — HOSPITAL ENCOUNTER (OUTPATIENT)
Dept: CARDIAC REHAB | Facility: HOSPITAL | Age: 59
Setting detail: THERAPIES SERIES
Discharge: HOME | End: 2019-12-18

## 2019-12-18 VITALS — HEART RATE: 95 BPM | DIASTOLIC BLOOD PRESSURE: 85 MMHG | SYSTOLIC BLOOD PRESSURE: 142 MMHG

## 2019-12-18 DIAGNOSIS — Z98.61 POST PTCA: Primary | ICD-10-CM

## 2019-12-18 PROCEDURE — 93798 PHYS/QHP OP CAR RHAB W/ECG: CPT

## 2020-01-06 ENCOUNTER — HOSPITAL ENCOUNTER (OUTPATIENT)
Dept: CARDIAC REHAB | Facility: HOSPITAL | Age: 60
Setting detail: THERAPIES SERIES
Discharge: HOME | End: 2020-01-06

## 2020-01-06 VITALS
WEIGHT: 220 LBS | DIASTOLIC BLOOD PRESSURE: 78 MMHG | BODY MASS INDEX: 30.68 KG/M2 | HEART RATE: 113 BPM | SYSTOLIC BLOOD PRESSURE: 139 MMHG

## 2020-01-06 DIAGNOSIS — Z98.61 POST PTCA: Primary | ICD-10-CM

## 2020-01-06 PROCEDURE — 93798 PHYS/QHP OP CAR RHAB W/ECG: CPT

## 2020-01-15 ENCOUNTER — HOSPITAL ENCOUNTER (OUTPATIENT)
Dept: CARDIAC REHAB | Facility: HOSPITAL | Age: 60
Setting detail: THERAPIES SERIES
Discharge: HOME | End: 2020-01-15

## 2020-01-15 VITALS
DIASTOLIC BLOOD PRESSURE: 82 MMHG | HEIGHT: 71 IN | HEART RATE: 99 BPM | WEIGHT: 220 LBS | SYSTOLIC BLOOD PRESSURE: 130 MMHG | BODY MASS INDEX: 30.8 KG/M2

## 2020-01-15 DIAGNOSIS — Z98.61 POST PTCA: Primary | ICD-10-CM

## 2020-01-15 PROCEDURE — 93798 PHYS/QHP OP CAR RHAB W/ECG: CPT

## 2020-01-20 ENCOUNTER — APPOINTMENT (OUTPATIENT)
Dept: CARDIAC REHAB | Facility: HOSPITAL | Age: 60
End: 2020-01-20

## 2020-01-22 ENCOUNTER — HOSPITAL ENCOUNTER (OUTPATIENT)
Dept: CARDIAC REHAB | Facility: HOSPITAL | Age: 60
Setting detail: THERAPIES SERIES
Discharge: HOME | End: 2020-01-22

## 2020-01-22 ENCOUNTER — APPOINTMENT (OUTPATIENT)
Dept: CARDIAC REHAB | Facility: HOSPITAL | Age: 60
End: 2020-01-22

## 2020-01-22 VITALS
WEIGHT: 223 LBS | HEART RATE: 99 BPM | BODY MASS INDEX: 31.1 KG/M2 | DIASTOLIC BLOOD PRESSURE: 77 MMHG | SYSTOLIC BLOOD PRESSURE: 144 MMHG

## 2020-01-22 DIAGNOSIS — Z98.61 POST PTCA: Primary | ICD-10-CM

## 2020-01-22 PROCEDURE — 93798 PHYS/QHP OP CAR RHAB W/ECG: CPT

## 2020-01-24 ENCOUNTER — HOSPITAL ENCOUNTER (OUTPATIENT)
Dept: CARDIAC REHAB | Facility: HOSPITAL | Age: 60
Setting detail: THERAPIES SERIES
Discharge: HOME | End: 2020-01-24

## 2020-01-24 ENCOUNTER — APPOINTMENT (OUTPATIENT)
Dept: CARDIAC REHAB | Facility: HOSPITAL | Age: 60
End: 2020-01-24

## 2020-01-24 VITALS — HEART RATE: 98 BPM | SYSTOLIC BLOOD PRESSURE: 123 MMHG | DIASTOLIC BLOOD PRESSURE: 83 MMHG

## 2020-01-24 DIAGNOSIS — Z98.61 POST PTCA: Primary | ICD-10-CM

## 2020-01-24 PROCEDURE — 93798 PHYS/QHP OP CAR RHAB W/ECG: CPT

## 2020-01-31 ENCOUNTER — HOSPITAL ENCOUNTER (OUTPATIENT)
Dept: CARDIAC REHAB | Facility: HOSPITAL | Age: 60
Setting detail: THERAPIES SERIES
Discharge: HOME | End: 2020-01-31

## 2020-01-31 VITALS
WEIGHT: 218 LBS | DIASTOLIC BLOOD PRESSURE: 83 MMHG | SYSTOLIC BLOOD PRESSURE: 135 MMHG | HEART RATE: 100 BPM | HEIGHT: 71 IN | BODY MASS INDEX: 30.52 KG/M2

## 2020-01-31 DIAGNOSIS — Z98.61 POST PTCA: Primary | ICD-10-CM

## 2020-01-31 PROCEDURE — 93798 PHYS/QHP OP CAR RHAB W/ECG: CPT

## 2020-02-05 ENCOUNTER — HOSPITAL ENCOUNTER (OUTPATIENT)
Dept: CARDIAC REHAB | Facility: HOSPITAL | Age: 60
Setting detail: THERAPIES SERIES
Discharge: HOME | End: 2020-02-05

## 2020-02-05 VITALS
DIASTOLIC BLOOD PRESSURE: 77 MMHG | HEART RATE: 94 BPM | WEIGHT: 220 LBS | BODY MASS INDEX: 30.68 KG/M2 | SYSTOLIC BLOOD PRESSURE: 129 MMHG

## 2020-02-05 DIAGNOSIS — Z98.61 POST PTCA: Primary | ICD-10-CM

## 2020-02-05 PROCEDURE — 93798 PHYS/QHP OP CAR RHAB W/ECG: CPT

## 2020-02-07 ENCOUNTER — HOSPITAL ENCOUNTER (OUTPATIENT)
Dept: CARDIAC REHAB | Facility: HOSPITAL | Age: 60
Setting detail: THERAPIES SERIES
Discharge: HOME | End: 2020-02-07

## 2020-02-07 VITALS — HEART RATE: 91 BPM | DIASTOLIC BLOOD PRESSURE: 74 MMHG | SYSTOLIC BLOOD PRESSURE: 135 MMHG

## 2020-02-07 DIAGNOSIS — Z98.61 POST PTCA: Primary | ICD-10-CM

## 2020-02-07 PROCEDURE — 93798 PHYS/QHP OP CAR RHAB W/ECG: CPT

## 2020-02-10 ENCOUNTER — HOSPITAL ENCOUNTER (OUTPATIENT)
Dept: CARDIAC REHAB | Facility: HOSPITAL | Age: 60
Setting detail: THERAPIES SERIES
Discharge: HOME | End: 2020-02-10

## 2020-02-10 VITALS
WEIGHT: 218 LBS | BODY MASS INDEX: 30.4 KG/M2 | DIASTOLIC BLOOD PRESSURE: 86 MMHG | SYSTOLIC BLOOD PRESSURE: 137 MMHG | HEART RATE: 104 BPM

## 2020-02-10 DIAGNOSIS — Z98.61 POST PTCA: Primary | ICD-10-CM

## 2020-02-10 PROCEDURE — 93798 PHYS/QHP OP CAR RHAB W/ECG: CPT

## 2020-02-12 ENCOUNTER — HOSPITAL ENCOUNTER (OUTPATIENT)
Dept: CARDIAC REHAB | Facility: HOSPITAL | Age: 60
Setting detail: THERAPIES SERIES
Discharge: HOME | End: 2020-02-12

## 2020-02-12 VITALS — DIASTOLIC BLOOD PRESSURE: 78 MMHG | HEART RATE: 104 BPM | SYSTOLIC BLOOD PRESSURE: 133 MMHG

## 2020-02-12 DIAGNOSIS — Z98.61 POST PTCA: Primary | ICD-10-CM

## 2020-02-12 PROCEDURE — 93798 PHYS/QHP OP CAR RHAB W/ECG: CPT

## 2020-02-14 ENCOUNTER — HOSPITAL ENCOUNTER (OUTPATIENT)
Dept: CARDIAC REHAB | Facility: HOSPITAL | Age: 60
Setting detail: THERAPIES SERIES
Discharge: HOME | End: 2020-02-14

## 2020-02-14 VITALS — HEART RATE: 98 BPM | SYSTOLIC BLOOD PRESSURE: 121 MMHG | DIASTOLIC BLOOD PRESSURE: 75 MMHG

## 2020-02-14 DIAGNOSIS — Z98.61 POST PTCA: Primary | ICD-10-CM

## 2020-02-14 PROCEDURE — 93798 PHYS/QHP OP CAR RHAB W/ECG: CPT

## 2020-02-19 ENCOUNTER — HOSPITAL ENCOUNTER (OUTPATIENT)
Dept: CARDIAC REHAB | Facility: HOSPITAL | Age: 60
Setting detail: THERAPIES SERIES
Discharge: HOME | End: 2020-02-19

## 2020-02-19 VITALS
SYSTOLIC BLOOD PRESSURE: 130 MMHG | WEIGHT: 220 LBS | HEART RATE: 98 BPM | DIASTOLIC BLOOD PRESSURE: 81 MMHG | BODY MASS INDEX: 30.68 KG/M2

## 2020-02-19 DIAGNOSIS — Z98.61 POST PTCA: Primary | ICD-10-CM

## 2020-02-19 PROCEDURE — 93798 PHYS/QHP OP CAR RHAB W/ECG: CPT

## 2020-02-21 ENCOUNTER — HOSPITAL ENCOUNTER (OUTPATIENT)
Dept: CARDIAC REHAB | Facility: HOSPITAL | Age: 60
Setting detail: THERAPIES SERIES
Discharge: HOME | End: 2020-02-21

## 2020-02-21 VITALS — SYSTOLIC BLOOD PRESSURE: 133 MMHG | HEART RATE: 105 BPM | DIASTOLIC BLOOD PRESSURE: 85 MMHG

## 2020-02-21 DIAGNOSIS — Z98.61 POST PTCA: Primary | ICD-10-CM

## 2020-02-21 PROCEDURE — 93798 PHYS/QHP OP CAR RHAB W/ECG: CPT

## 2020-02-26 ENCOUNTER — HOSPITAL ENCOUNTER (OUTPATIENT)
Dept: CARDIAC REHAB | Facility: HOSPITAL | Age: 60
Setting detail: THERAPIES SERIES
Discharge: HOME | End: 2020-02-26

## 2020-02-26 VITALS
HEART RATE: 94 BPM | DIASTOLIC BLOOD PRESSURE: 88 MMHG | HEIGHT: 71 IN | WEIGHT: 219 LBS | SYSTOLIC BLOOD PRESSURE: 130 MMHG | BODY MASS INDEX: 30.66 KG/M2

## 2020-02-26 DIAGNOSIS — Z98.61 POST PTCA: Primary | ICD-10-CM

## 2020-02-26 PROCEDURE — 93798 PHYS/QHP OP CAR RHAB W/ECG: CPT

## 2020-03-02 ENCOUNTER — HOSPITAL ENCOUNTER (OUTPATIENT)
Dept: CARDIAC REHAB | Facility: HOSPITAL | Age: 60
Setting detail: THERAPIES SERIES
Discharge: HOME | End: 2020-03-02

## 2020-03-02 VITALS
WEIGHT: 218 LBS | HEIGHT: 71 IN | HEART RATE: 107 BPM | BODY MASS INDEX: 30.52 KG/M2 | DIASTOLIC BLOOD PRESSURE: 73 MMHG | SYSTOLIC BLOOD PRESSURE: 127 MMHG

## 2020-03-02 DIAGNOSIS — Z98.61 POST PTCA: Primary | ICD-10-CM

## 2020-03-02 PROCEDURE — 93798 PHYS/QHP OP CAR RHAB W/ECG: CPT

## 2020-03-04 ENCOUNTER — APPOINTMENT (OUTPATIENT)
Dept: CARDIAC REHAB | Facility: HOSPITAL | Age: 60
End: 2020-03-04

## 2020-03-06 ENCOUNTER — APPOINTMENT (OUTPATIENT)
Dept: CARDIAC REHAB | Facility: HOSPITAL | Age: 60
End: 2020-03-06

## 2020-03-09 ENCOUNTER — APPOINTMENT (OUTPATIENT)
Dept: CARDIAC REHAB | Facility: HOSPITAL | Age: 60
End: 2020-03-09

## 2020-03-11 ENCOUNTER — APPOINTMENT (OUTPATIENT)
Dept: CARDIAC REHAB | Facility: HOSPITAL | Age: 60
End: 2020-03-11

## 2020-03-13 ENCOUNTER — APPOINTMENT (OUTPATIENT)
Dept: CARDIAC REHAB | Facility: HOSPITAL | Age: 60
End: 2020-03-13

## 2020-03-16 ENCOUNTER — APPOINTMENT (OUTPATIENT)
Dept: CARDIAC REHAB | Facility: HOSPITAL | Age: 60
End: 2020-03-16

## 2020-03-18 ENCOUNTER — APPOINTMENT (OUTPATIENT)
Dept: CARDIAC REHAB | Facility: HOSPITAL | Age: 60
End: 2020-03-18

## 2020-04-06 ENCOUNTER — APPOINTMENT (OUTPATIENT)
Dept: CARDIAC REHAB | Facility: HOSPITAL | Age: 60
End: 2020-04-06

## 2020-04-08 ENCOUNTER — APPOINTMENT (OUTPATIENT)
Dept: CARDIAC REHAB | Facility: HOSPITAL | Age: 60
End: 2020-04-08

## 2020-04-10 ENCOUNTER — APPOINTMENT (OUTPATIENT)
Dept: CARDIAC REHAB | Facility: HOSPITAL | Age: 60
End: 2020-04-10

## 2020-06-22 ENCOUNTER — APPOINTMENT (OUTPATIENT)
Dept: CARDIAC REHAB | Facility: HOSPITAL | Age: 60
End: 2020-06-22

## 2020-06-24 ENCOUNTER — APPOINTMENT (OUTPATIENT)
Dept: CARDIAC REHAB | Facility: HOSPITAL | Age: 60
End: 2020-06-24

## 2020-06-26 ENCOUNTER — APPOINTMENT (OUTPATIENT)
Dept: CARDIAC REHAB | Facility: HOSPITAL | Age: 60
End: 2020-06-26

## 2020-09-09 ENCOUNTER — APPOINTMENT (OUTPATIENT)
Dept: CARDIAC REHAB | Facility: HOSPITAL | Age: 60
End: 2020-09-09

## 2020-09-11 ENCOUNTER — APPOINTMENT (OUTPATIENT)
Dept: CARDIAC REHAB | Facility: HOSPITAL | Age: 60
End: 2020-09-11

## 2020-11-23 ENCOUNTER — APPOINTMENT (OUTPATIENT)
Dept: CARDIAC REHAB | Facility: HOSPITAL | Age: 60
End: 2020-11-23

## 2020-11-25 ENCOUNTER — APPOINTMENT (OUTPATIENT)
Dept: CARDIAC REHAB | Facility: HOSPITAL | Age: 60
End: 2020-11-25

## 2020-11-27 ENCOUNTER — APPOINTMENT (OUTPATIENT)
Dept: CARDIAC REHAB | Facility: HOSPITAL | Age: 60
End: 2020-11-27

## 2021-02-08 ENCOUNTER — APPOINTMENT (OUTPATIENT)
Dept: CARDIAC REHAB | Facility: HOSPITAL | Age: 61
End: 2021-02-08

## 2021-02-10 ENCOUNTER — APPOINTMENT (OUTPATIENT)
Dept: CARDIAC REHAB | Facility: HOSPITAL | Age: 61
End: 2021-02-10

## 2021-02-12 ENCOUNTER — APPOINTMENT (OUTPATIENT)
Dept: CARDIAC REHAB | Facility: HOSPITAL | Age: 61
End: 2021-02-12

## 2021-04-26 ENCOUNTER — APPOINTMENT (OUTPATIENT)
Dept: CARDIAC REHAB | Facility: HOSPITAL | Age: 61
End: 2021-04-26

## 2021-04-28 ENCOUNTER — APPOINTMENT (OUTPATIENT)
Dept: CARDIAC REHAB | Facility: HOSPITAL | Age: 61
End: 2021-04-28

## 2021-04-30 ENCOUNTER — APPOINTMENT (OUTPATIENT)
Dept: CARDIAC REHAB | Facility: HOSPITAL | Age: 61
End: 2021-04-30

## 2021-07-12 ENCOUNTER — APPOINTMENT (OUTPATIENT)
Dept: CARDIAC REHAB | Facility: HOSPITAL | Age: 61
End: 2021-07-12

## 2021-07-14 ENCOUNTER — APPOINTMENT (OUTPATIENT)
Dept: CARDIAC REHAB | Facility: HOSPITAL | Age: 61
End: 2021-07-14

## 2021-07-16 ENCOUNTER — APPOINTMENT (OUTPATIENT)
Dept: CARDIAC REHAB | Facility: HOSPITAL | Age: 61
End: 2021-07-16

## 2021-09-27 ENCOUNTER — APPOINTMENT (OUTPATIENT)
Dept: CARDIAC REHAB | Facility: HOSPITAL | Age: 61
End: 2021-09-27

## 2021-09-29 ENCOUNTER — APPOINTMENT (OUTPATIENT)
Dept: CARDIAC REHAB | Facility: HOSPITAL | Age: 61
End: 2021-09-29

## 2021-10-01 ENCOUNTER — APPOINTMENT (OUTPATIENT)
Dept: CARDIAC REHAB | Facility: HOSPITAL | Age: 61
End: 2021-10-01

## 2021-12-13 ENCOUNTER — APPOINTMENT (OUTPATIENT)
Dept: CARDIAC REHAB | Facility: HOSPITAL | Age: 61
End: 2021-12-13

## 2021-12-15 ENCOUNTER — APPOINTMENT (OUTPATIENT)
Dept: CARDIAC REHAB | Facility: HOSPITAL | Age: 61
End: 2021-12-15

## 2021-12-17 ENCOUNTER — APPOINTMENT (OUTPATIENT)
Dept: CARDIAC REHAB | Facility: HOSPITAL | Age: 61
End: 2021-12-17

## 2022-02-28 ENCOUNTER — APPOINTMENT (OUTPATIENT)
Dept: CARDIAC REHAB | Facility: HOSPITAL | Age: 62
End: 2022-02-28

## 2022-03-02 ENCOUNTER — APPOINTMENT (OUTPATIENT)
Dept: CARDIAC REHAB | Facility: HOSPITAL | Age: 62
End: 2022-03-02

## 2022-03-04 ENCOUNTER — APPOINTMENT (OUTPATIENT)
Dept: CARDIAC REHAB | Facility: HOSPITAL | Age: 62
End: 2022-03-04

## 2022-05-16 ENCOUNTER — APPOINTMENT (OUTPATIENT)
Dept: CARDIAC REHAB | Facility: HOSPITAL | Age: 62
End: 2022-05-16

## 2022-05-18 ENCOUNTER — APPOINTMENT (OUTPATIENT)
Dept: CARDIAC REHAB | Facility: HOSPITAL | Age: 62
End: 2022-05-18
